# Patient Record
Sex: FEMALE | Race: ASIAN | NOT HISPANIC OR LATINO
[De-identification: names, ages, dates, MRNs, and addresses within clinical notes are randomized per-mention and may not be internally consistent; named-entity substitution may affect disease eponyms.]

---

## 2021-04-30 ENCOUNTER — APPOINTMENT (OUTPATIENT)
Dept: ORTHOPEDIC SURGERY | Facility: CLINIC | Age: 41
End: 2021-04-30
Payer: COMMERCIAL

## 2021-04-30 VITALS
HEIGHT: 60 IN | WEIGHT: 180 LBS | BODY MASS INDEX: 35.34 KG/M2 | HEART RATE: 80 BPM | SYSTOLIC BLOOD PRESSURE: 117 MMHG | DIASTOLIC BLOOD PRESSURE: 80 MMHG

## 2021-04-30 DIAGNOSIS — M94.261 CHONDROMALACIA, RIGHT KNEE: ICD-10-CM

## 2021-04-30 DIAGNOSIS — M72.2 PLANTAR FASCIAL FIBROMATOSIS: ICD-10-CM

## 2021-04-30 PROCEDURE — 73564 X-RAY EXAM KNEE 4 OR MORE: CPT | Mod: RT

## 2021-04-30 PROCEDURE — 99072 ADDL SUPL MATRL&STAF TM PHE: CPT

## 2021-04-30 PROCEDURE — 99203 OFFICE O/P NEW LOW 30 MIN: CPT

## 2021-04-30 RX ORDER — DICLOFENAC SODIUM 50 MG/1
50 TABLET, DELAYED RELEASE ORAL
Qty: 60 | Refills: 1 | Status: ACTIVE | COMMUNITY
Start: 2021-04-30 | End: 1900-01-01

## 2021-04-30 NOTE — PHYSICAL EXAM
[de-identified] : .Physical Examination\par General: well nourished, in no acute distress, alert and oriented to person, place and time\par Psychiatric: normal mood and affect, no abnormal movements or speech patterns\par Eyes: vision intact no glasses\par Throat: no thyromegaly\par Lymph: no enlarged nodes, no lymphedema in extremity\par Respiratory: no wheezing, no shortness of breath with ambulation\par Cardiac: no cardiac leg swelling, 2+ peripheral pulses\par Neurology: normal gross sensation in extremities to light touch\par Abdomen: soft, non-tender, tympanic, no masses\par \par Musculoskeletal Examination\par Ambulation	- antalgic gait, - assistive devices\par \par Knee			Right			Left\par General\par      Swelling/Deformity	normal			normal	\par      Skin			normal			normal\par      Erythema		-			-\par      Standing Alignment	neutral			neutral\par      Effusion		none			none\par Range of Motion\par      Hip			full painless ROM		full painless ROM\par      Knee Flexion		130			130\par      Knee Extension	0			0\par Patella\par      J Sign		-			-\par      Quad Medial/Lateral	1/1 1/1\par      Apprehension		-			-\par      Federico's		-			-\par      Grind Sign		-			-\par      Crepitus		-			-\par Palpation\par      Medial Joint Line	-			-\par      Medial Fem Condyle	-			-\par      Lateral Joint Line	-			-\par      Quad Tendon		-			-\par      Patella Tendon	-			-\par      Medial Patella		-			-\par      Lateral Patella 	-			-\par      Posterior Knee	-			-\par right heel medial pain\par tight achilles cord\par Ligamentous\par      Varus @ 0° / 30°	-/-			-/-\par      Valgus @ 0° / 30°	-/-			-/-\par      Lachman		-			-\par      Pivot Shift		-			-\par      Anterior Drawer	-			-\par      Posterior Drawer	-			-\par Meniscus\par      Marie		-			-\par      Flexion Pinch		-			-\par Strength Examination/Atrophy\par      Hip Flexors 		5+			5+\par      Quadriceps		5+			5+\par      Hamstring		5+			5+\par      Tibialis Anterior	5+			5+\par      Achilles/Soleus	5+			5+\par Sensation\par      Deep Peroneal	normal			normal\par      Superficial Peroneal 	normal			normal\par      Sural  		normal			normal\par      Posterior Tibial 	normal			normal\par      Saphneous 		normal			normal\par Pulses\par      DP			2+			2+\par  [de-identified] : 4 views of the affected Right knee (standing AP, flexing standing AP, 30degree flexed lateral, sunrise view)\par were ordered, obtained and evaluated by myself today and\par demonstrate:\par There is trace medial weightbearing asymmetric narrowing\par Trace osteophytic lipping\par Trace suprapatellar effusion\par Trace patellofemoral joint space loss without evidence of tilt [or] subluxation on sunrise view\par Normal soft tissue density\par Otherwise normal osseous bone structure without fracture or dislocation

## 2021-04-30 NOTE — HISTORY OF PRESENT ILLNESS
[de-identified] : CC Right knee\par \par HPI 41 yo female presents with in onset of several weeks of early pain in the posterior Right knee [without injury]. The pain is resolved, and rated a 0 out of 10, described as sharp, [without radiation]. Rest makes the pain better and walking standing stairs makes the pain worse. The patient reports associated symptoms of pain. The patient - pain at night affecting sleep, and - similar pain previously. Also reports chronic right heel pain for many years which is improved recently.\par \par The patient has tried the following treatments:\par Activity modification	+\par Ice/Compression  	+\par Braces    		-\par Nsaids    		+\par Physical Therapy 	-\par Cortisone Injection	-\par Visco Injection		-\par Zilretta			-\par Arthroscopy		-\par \par Review of Systems is positive for the above musculoskeletal symptoms and is otherwise non-contributory for general, constitutional, psychiatric, neurologic, HEENT, cardiac, respiratory, gastrointestinal, reproductive, lymphatic, and dermatologic complaints.\par \par Consult by Dr Thi Cohen\par

## 2021-04-30 NOTE — DISCUSSION/SUMMARY
[de-identified] : Right knee chondromalacia\par Right heel plantar fasciitis\par \par \par I discussed my findings and history exam and radiology\par \par Discussed operative and nonoperative modalities of treatment or plantar fasciitis. information form provided\par Given lack of nonoperative management recommend\par \par Physical therapy\par \par The patient was prescribed Diclofenac PO non-steroidal anti-inflammatory medication. 50mg tablets twice daily to be taken for at least 1-2 weeks in a row and then PRN afterwards. Risks and benefits were discussed and include but not limited to renal damage and GI ulceration and bleeding.  They were advised to take with food to limit stomach upset as well as warned to stop the medication if worsening gastric pain or dizziness or other side effects. Also to immediately stop the medication and seek appropriate medical attention if any severe stomach ache, gastritis, black/red vomit, black/red stools or any other medical concern.\par \par \par Ice heat modalities\par \par Nighttime ankle splints\par \par Followup p.r.n.\par \par \par

## 2023-05-22 ENCOUNTER — APPOINTMENT (OUTPATIENT)
Dept: MATERNAL FETAL MEDICINE | Facility: CLINIC | Age: 43
End: 2023-05-22
Payer: COMMERCIAL

## 2023-05-22 ENCOUNTER — ASOB RESULT (OUTPATIENT)
Age: 43
End: 2023-05-22

## 2023-05-22 DIAGNOSIS — O24.119 PRE-EXISTING TYPE 2 DIABETES MELLITUS, TYPE 2, IN PREGNANCY, UNSPECIFIED TRIMESTER: ICD-10-CM

## 2023-05-22 PROCEDURE — G0108 DIAB MANAGE TRN  PER INDIV: CPT | Mod: 95

## 2023-05-23 PROBLEM — O24.119 TYPE 2 DIABETES MELLITUS IN PREGNANCY: Status: ACTIVE | Noted: 2023-05-23

## 2023-05-30 ENCOUNTER — NON-APPOINTMENT (OUTPATIENT)
Age: 43
End: 2023-05-30

## 2023-05-30 ENCOUNTER — APPOINTMENT (OUTPATIENT)
Dept: ANTEPARTUM | Facility: CLINIC | Age: 43
End: 2023-05-30
Payer: COMMERCIAL

## 2023-05-30 ENCOUNTER — ASOB RESULT (OUTPATIENT)
Age: 43
End: 2023-05-30

## 2023-05-30 ENCOUNTER — LABORATORY RESULT (OUTPATIENT)
Age: 43
End: 2023-05-30

## 2023-05-30 PROCEDURE — 36416 COLLJ CAPILLARY BLOOD SPEC: CPT

## 2023-05-30 PROCEDURE — 76813 OB US NUCHAL MEAS 1 GEST: CPT

## 2023-05-30 PROCEDURE — 76801 OB US < 14 WKS SINGLE FETUS: CPT

## 2023-06-01 ENCOUNTER — NON-APPOINTMENT (OUTPATIENT)
Age: 43
End: 2023-06-01

## 2023-06-13 ENCOUNTER — ASOB RESULT (OUTPATIENT)
Age: 43
End: 2023-06-13

## 2023-06-13 ENCOUNTER — APPOINTMENT (OUTPATIENT)
Dept: MATERNAL FETAL MEDICINE | Facility: CLINIC | Age: 43
End: 2023-06-13
Payer: COMMERCIAL

## 2023-06-13 PROCEDURE — G0108 DIAB MANAGE TRN  PER INDIV: CPT | Mod: 95

## 2023-06-26 ENCOUNTER — ASOB RESULT (OUTPATIENT)
Age: 43
End: 2023-06-26

## 2023-06-26 ENCOUNTER — APPOINTMENT (OUTPATIENT)
Dept: MATERNAL FETAL MEDICINE | Facility: CLINIC | Age: 43
End: 2023-06-26
Payer: COMMERCIAL

## 2023-06-26 PROCEDURE — G0108 DIAB MANAGE TRN  PER INDIV: CPT | Mod: 95

## 2023-06-28 ENCOUNTER — NON-APPOINTMENT (OUTPATIENT)
Age: 43
End: 2023-06-28

## 2023-06-29 ENCOUNTER — APPOINTMENT (OUTPATIENT)
Dept: ULTRASOUND IMAGING | Facility: IMAGING CENTER | Age: 43
End: 2023-06-29
Payer: COMMERCIAL

## 2023-06-29 ENCOUNTER — OUTPATIENT (OUTPATIENT)
Dept: OUTPATIENT SERVICES | Facility: HOSPITAL | Age: 43
LOS: 1 days | End: 2023-06-29
Payer: COMMERCIAL

## 2023-06-29 ENCOUNTER — APPOINTMENT (OUTPATIENT)
Dept: RADIOLOGY | Facility: HOSPITAL | Age: 43
End: 2023-06-29

## 2023-06-29 ENCOUNTER — APPOINTMENT (OUTPATIENT)
Dept: NEPHROLOGY | Facility: CLINIC | Age: 43
End: 2023-06-29
Payer: COMMERCIAL

## 2023-06-29 ENCOUNTER — LABORATORY RESULT (OUTPATIENT)
Age: 43
End: 2023-06-29

## 2023-06-29 VITALS
HEART RATE: 88 BPM | TEMPERATURE: 97.1 F | HEIGHT: 60 IN | WEIGHT: 179.67 LBS | OXYGEN SATURATION: 98 % | DIASTOLIC BLOOD PRESSURE: 65 MMHG | BODY MASS INDEX: 35.28 KG/M2 | SYSTOLIC BLOOD PRESSURE: 117 MMHG

## 2023-06-29 DIAGNOSIS — Z82.49 FAMILY HISTORY OF ISCHEMIC HEART DISEASE AND OTHER DISEASES OF THE CIRCULATORY SYSTEM: ICD-10-CM

## 2023-06-29 DIAGNOSIS — R76.11 NONSPECIFIC REACTION TO TUBERCULIN SKIN TEST WITHOUT ACTIVE TUBERCULOSIS: ICD-10-CM

## 2023-06-29 DIAGNOSIS — Z83.3 FAMILY HISTORY OF DIABETES MELLITUS: ICD-10-CM

## 2023-06-29 DIAGNOSIS — R80.9 PROTEINURIA, UNSPECIFIED: ICD-10-CM

## 2023-06-29 DIAGNOSIS — Z83.438 FAMILY HISTORY OF OTHER DISORDER OF LIPOPROTEIN METABOLISM AND OTHER LIPIDEMIA: ICD-10-CM

## 2023-06-29 PROCEDURE — 76770 US EXAM ABDO BACK WALL COMP: CPT | Mod: 26

## 2023-06-29 PROCEDURE — 76770 US EXAM ABDO BACK WALL COMP: CPT

## 2023-06-29 PROCEDURE — 99204 OFFICE O/P NEW MOD 45 MIN: CPT

## 2023-06-29 PROCEDURE — 71045 X-RAY EXAM CHEST 1 VIEW: CPT | Mod: 26

## 2023-06-29 NOTE — ASSESSMENT
[FreeTextEntry1] : 42 year old female \par DM diagnosed this pregnancy (previously preDM)\par obesity\par sent for proteinuria\par \par #proteinuria- r/o GN\par May 8th 2023 24 hr urine 525mg approx 10 weeks gestation at that time\par Subsequent UA prot neg; blood +\par check ua, prot/cr\par check GN serology\par check renal and bladder sono\par \par #PEC prophylaxis\par needs baby ASA\par check baseline PEC labs\par explained signs/sx\par \par #DM-on meds\par \par \par \par \par \par \par \par \par \par \par Baby ASA

## 2023-06-29 NOTE — PHYSICAL EXAM
[General Appearance - In No Acute Distress] : in no acute distress [General Appearance - Alert] : alert [General Appearance - Well Nourished] : well nourished [General Appearance - Well Developed] : well developed [Sclera] : the sclera and conjunctiva were normal [Outer Ear] : the ears and nose were normal in appearance [Neck Appearance] : the appearance of the neck was normal [Neck Cervical Mass (___cm)] : no neck mass was observed [Jugular Venous Distention Increased] : there was no jugular-venous distention [] : no respiratory distress [Auscultation Breath Sounds / Voice Sounds] : lungs were clear to auscultation bilaterally [Respiration, Rhythm And Depth] : normal respiratory rhythm and effort [Apical Impulse] : the apical impulse was normal [Heart Rate And Rhythm] : heart rate was normal and rhythm regular [Heart Sounds] : normal S1 and S2 [Heart Sounds Gallop] : no gallops [FreeTextEntry1] : trace LE edema b/l [Bowel Sounds] : normal bowel sounds [Abdomen Soft] : soft [Abdomen Tenderness] : non-tender [No CVA Tenderness] : no ~M costovertebral angle tenderness [Abnormal Walk] : normal gait [Musculoskeletal - Swelling] : no joint swelling seen [Skin Color & Pigmentation] : normal skin color and pigmentation [Skin Turgor] : normal skin turgor [No Focal Deficits] : no focal deficits [Oriented To Time, Place, And Person] : oriented to person, place, and time [Impaired Insight] : insight and judgment were intact [Affect] : the affect was normal [Mood] : the mood was normal

## 2023-06-29 NOTE — HISTORY OF PRESENT ILLNESS
[FreeTextEntry1] : 43 yo\par #preDM Nov 2022 was on Metformin-->DM with pregnancy since March\par # +Quant Gold May 2023- evaluated by Pulm and has to get Chest Xray\par # overweight\par \par PMD Dr Jazmin Mancini - [facility]\par Ob: Dr Cohen- [facility]\par \par Pt is 17 weeks pregnant\par Sent due to proteinuria on 24 hr urine collection \par May 8th 2023 24 hr urine 525mg approx 10 weeks gestation at that time\par Subsequent UA prot neg; blood +\par \par Pregnancy x:\par This is 3rd pregnancy\par First Pregnancy Oct 2014- C section due to prolonged labor, cord around baby at 38 week. \par 2nd Pregnancy- Oct 2016- Vaginal Delivery 38 weeks; Gestational DM; low BP after delivery; \par No h/o PEC; \par 3rd pregnancy: current pregnancy: ASHLIE Dec 9th; LMP: March 4th 2023\par  This pregnancy complicated by proteinuria and DM. No hospitalizations, UTIs, nausea, vomiting\par \par Denies leg swelling\par Denies dysuria\par Denies foamy urine\par Denies gross hematuria\par No joint pains and rashes\par Other ROS neg\par \par This is the first time patient has heard of proteinuria.  \par BP normal <120/66s\par \par Meds:\par prenatal\par Iron \par insulin\par \par ALL:\par Amox: diarrhea(more of side effect)\par \par Soc hx:\par denies smoking, alcohol, drugs\par second hand smoke at work\par [employer] for workforce \par from [place]

## 2023-06-30 ENCOUNTER — NON-APPOINTMENT (OUTPATIENT)
Age: 43
End: 2023-06-30

## 2023-06-30 LAB
ALBUMIN MFR SERPL ELPH: 50.7 %
ALBUMIN SERPL ELPH-MCNC: 3.7 G/DL
ALBUMIN SERPL ELPH-MCNC: 3.7 G/DL
ALBUMIN SERPL-MCNC: 3.4 G/DL
ALBUMIN/GLOB SERPL: 1 RATIO
ALP BLD-CCNC: 69 U/L
ALPHA1 GLOB MFR SERPL ELPH: 5.1 %
ALPHA1 GLOB SERPL ELPH-MCNC: 0.3 G/DL
ALPHA2 GLOB MFR SERPL ELPH: 10.5 %
ALPHA2 GLOB SERPL ELPH-MCNC: 0.7 G/DL
ALT SERPL-CCNC: 17 U/L
ANION GAP SERPL CALC-SCNC: 14 MMOL/L
APPEARANCE: CLEAR
AST SERPL-CCNC: 20 U/L
B-GLOBULIN MFR SERPL ELPH: 14.1 %
B-GLOBULIN SERPL ELPH-MCNC: 0.9 G/DL
BACTERIA: NEGATIVE /HPF
BILIRUB DIRECT SERPL-MCNC: 0.1 MG/DL
BILIRUB INDIRECT SERPL-MCNC: 0.2 MG/DL
BILIRUB SERPL-MCNC: 0.2 MG/DL
BILIRUBIN URINE: NEGATIVE
BLOOD URINE: ABNORMAL
BUN SERPL-MCNC: 10 MG/DL
C3 SERPL-MCNC: 157 MG/DL
C4 SERPL-MCNC: 19 MG/DL
CALCIUM SERPL-MCNC: 9.2 MG/DL
CAST: 0 /LPF
CHLORIDE SERPL-SCNC: 105 MMOL/L
CO2 SERPL-SCNC: 18 MMOL/L
COLOR: YELLOW
CREAT SERPL-MCNC: 0.57 MG/DL
CREAT SPEC-SCNC: 43 MG/DL
CREAT/PROT UR: 0.2 RATIO
DEPRECATED KAPPA LC FREE/LAMBDA SER: 1.08 RATIO
EGFR: 116 ML/MIN/1.73M2
EPITHELIAL CELLS: 1 /HPF
ESTIMATED AVERAGE GLUCOSE: 131 MG/DL
FERRITIN SERPL-MCNC: 248 NG/ML
GAMMA GLOB FLD ELPH-MCNC: 1.3 G/DL
GAMMA GLOB MFR SERPL ELPH: 19.6 %
GLUCOSE QUALITATIVE U: NEGATIVE MG/DL
GLUCOSE SERPL-MCNC: 87 MG/DL
HAPTOGLOB SERPL-MCNC: 152 MG/DL
HBA1C MFR BLD HPLC: 6.2 %
HBV CORE IGG+IGM SER QL: NONREACTIVE
HBV CORE IGM SER QL: NONREACTIVE
HBV SURFACE AB SER QL: NONREACTIVE
HBV SURFACE AB SERPL IA-ACNC: <3 MIU/ML
HCV AB SER QL: NONREACTIVE
HCV S/CO RATIO: 0.08 S/CO
HIV1+2 AB SPEC QL IA.RAPID: NONREACTIVE
INTERPRETATION SERPL IEP-IMP: NORMAL
IRON SATN MFR SERPL: 16 %
IRON SERPL-MCNC: 53 UG/DL
KAPPA LC CSF-MCNC: 2.23 MG/DL
KAPPA LC SERPL-MCNC: 2.4 MG/DL
KETONES URINE: NEGATIVE MG/DL
LDH SERPL-CCNC: 166 U/L
LEUKOCYTE ESTERASE URINE: NEGATIVE
M PROTEIN SPEC IFE-MCNC: NORMAL
MAGNESIUM SERPL-MCNC: 1.8 MG/DL
MICROSCOPIC-UA: NORMAL
NITRITE URINE: NEGATIVE
PH URINE: 6.5
PHOSPHATE SERPL-MCNC: 3.8 MG/DL
POTASSIUM SERPL-SCNC: 4.1 MMOL/L
PROT SERPL-MCNC: 6.7 G/DL
PROT UR-MCNC: 10 MG/DL
PROTEIN URINE: NORMAL MG/DL
RED BLOOD CELLS URINE: 0 /HPF
REVIEW: NORMAL
RPR SER-TITR: NORMAL
SODIUM SERPL-SCNC: 137 MMOL/L
SPECIFIC GRAVITY URINE: 1.01
TIBC SERPL-MCNC: 338 UG/DL
UIBC SERPL-MCNC: 285 UG/DL
URATE SERPL-MCNC: 4.4 MG/DL
UROBILINOGEN URINE: 0.2 MG/DL
WHITE BLOOD CELLS URINE: 0 /HPF

## 2023-07-10 ENCOUNTER — APPOINTMENT (OUTPATIENT)
Dept: MATERNAL FETAL MEDICINE | Facility: CLINIC | Age: 43
End: 2023-07-10
Payer: COMMERCIAL

## 2023-07-10 ENCOUNTER — ASOB RESULT (OUTPATIENT)
Age: 43
End: 2023-07-10

## 2023-07-10 PROCEDURE — G0108 DIAB MANAGE TRN  PER INDIV: CPT | Mod: 95

## 2023-07-24 ENCOUNTER — ASOB RESULT (OUTPATIENT)
Age: 43
End: 2023-07-24

## 2023-07-24 ENCOUNTER — APPOINTMENT (OUTPATIENT)
Dept: ANTEPARTUM | Facility: CLINIC | Age: 43
End: 2023-07-24
Payer: COMMERCIAL

## 2023-07-24 ENCOUNTER — APPOINTMENT (OUTPATIENT)
Dept: MATERNAL FETAL MEDICINE | Facility: CLINIC | Age: 43
End: 2023-07-24

## 2023-07-24 PROCEDURE — 76811 OB US DETAILED SNGL FETUS: CPT

## 2023-07-27 ENCOUNTER — NON-APPOINTMENT (OUTPATIENT)
Age: 43
End: 2023-07-27

## 2023-08-01 ENCOUNTER — APPOINTMENT (OUTPATIENT)
Dept: ANTEPARTUM | Facility: CLINIC | Age: 43
End: 2023-08-01
Payer: COMMERCIAL

## 2023-08-01 ENCOUNTER — ASOB RESULT (OUTPATIENT)
Age: 43
End: 2023-08-01

## 2023-08-01 PROCEDURE — 76816 OB US FOLLOW-UP PER FETUS: CPT

## 2023-08-01 PROCEDURE — 93325 DOPPLER ECHO COLOR FLOW MAPG: CPT

## 2023-08-01 PROCEDURE — 76825 ECHO EXAM OF FETAL HEART: CPT

## 2023-08-01 PROCEDURE — 76827 ECHO EXAM OF FETAL HEART: CPT | Mod: 59

## 2023-08-02 LAB
ALBUMIN SERPL ELPH-MCNC: 3.6 G/DL
ALBUMIN SERPL ELPH-MCNC: 3.6 G/DL
ALP BLD-CCNC: 75 U/L
ALT SERPL-CCNC: 14 U/L
ANA SER IF-ACNC: NEGATIVE
ANION GAP SERPL CALC-SCNC: 12 MMOL/L
APPEARANCE: CLEAR
AST SERPL-CCNC: 18 U/L
BACTERIA: NEGATIVE /HPF
BILIRUB DIRECT SERPL-MCNC: 0.1 MG/DL
BILIRUB INDIRECT SERPL-MCNC: 0.1 MG/DL
BILIRUB SERPL-MCNC: 0.2 MG/DL
BILIRUBIN URINE: NEGATIVE
BLOOD URINE: ABNORMAL
BSA DERIVED: 1.73 M2
BUN SERPL-MCNC: 8 MG/DL
CALCIUM SERPL-MCNC: 8.8 MG/DL
CAST: 0 /LPF
CHLORIDE SERPL-SCNC: 105 MMOL/L
CO2 SERPL-SCNC: 19 MMOL/L
COLOR: YELLOW
CREAT 24H UR-MCNC: 1.2 G/24 H
CREAT ?TM UR-MCNC: 43 MG/DL
CREAT CL 24H UR+SERPL-VRATE: 167 ML/MIN
CREAT SERPL-MCNC: 0.52 MG/DL
CREAT SPEC-SCNC: 27 MG/DL
CREAT/PROT UR: 0.3 RATIO
DSDNA AB SER-ACNC: <12 IU/ML
EGFR: 118 ML/MIN/1.73M2
EPITHELIAL CELLS: 2 /HPF
FERRITIN SERPL-MCNC: 196 NG/ML
GBM AB TITR SER IF: <0.2
GLUCOSE QUALITATIVE U: NEGATIVE MG/DL
GLUCOSE SERPL-MCNC: 93 MG/DL
HAPTOGLOB SERPL-MCNC: 134 MG/DL
IRON SATN MFR SERPL: 16 %
IRON SERPL-MCNC: 55 UG/DL
KETONES URINE: NEGATIVE MG/DL
LDH SERPL-CCNC: 164 U/L
LEUKOCYTE ESTERASE URINE: NEGATIVE
MAGNESIUM SERPL-MCNC: 1.7 MG/DL
MICROSCOPIC-UA: NORMAL
NITRITE URINE: NEGATIVE
PH URINE: 7
PHOSPHATE SERPL-MCNC: 3.6 MG/DL
PHOSPHOLIPASE A2 RECEPTOR ELISA: <1.8 RU/ML
POTASSIUM SERPL-SCNC: 3.7 MMOL/L
PROT 24H UR-MRATE: 8 MG/DL
PROT ?TM UR-MCNC: 24 HR
PROT SERPL-MCNC: 6.5 G/DL
PROT UR-MCNC: 232 MG/24 H
PROT UR-MCNC: 7 MG/DL
PROTEIN URINE: NEGATIVE MG/DL
RED BLOOD CELLS URINE: 0 /HPF
SODIUM SERPL-SCNC: 137 MMOL/L
SPECIFIC GRAVITY URINE: 1.01
SPECIMEN VOL 24H UR: 2900 ML
TIBC SERPL-MCNC: 344 UG/DL
UIBC SERPL-MCNC: 288 UG/DL
URATE SERPL-MCNC: 3.6 MG/DL
UROBILINOGEN URINE: 0.2 MG/DL
WHITE BLOOD CELLS URINE: 1 /HPF

## 2023-08-03 ENCOUNTER — ASOB RESULT (OUTPATIENT)
Age: 43
End: 2023-08-03

## 2023-08-03 ENCOUNTER — TRANSCRIPTION ENCOUNTER (OUTPATIENT)
Age: 43
End: 2023-08-03

## 2023-08-03 ENCOUNTER — APPOINTMENT (OUTPATIENT)
Dept: MATERNAL FETAL MEDICINE | Facility: CLINIC | Age: 43
End: 2023-08-03
Payer: COMMERCIAL

## 2023-08-03 PROCEDURE — G0108 DIAB MANAGE TRN  PER INDIV: CPT | Mod: 95

## 2023-08-14 ENCOUNTER — ASOB RESULT (OUTPATIENT)
Age: 43
End: 2023-08-14

## 2023-08-14 ENCOUNTER — APPOINTMENT (OUTPATIENT)
Dept: MATERNAL FETAL MEDICINE | Facility: CLINIC | Age: 43
End: 2023-08-14
Payer: COMMERCIAL

## 2023-08-14 PROCEDURE — G0108 DIAB MANAGE TRN  PER INDIV: CPT | Mod: 95

## 2023-08-22 ENCOUNTER — APPOINTMENT (OUTPATIENT)
Dept: MATERNAL FETAL MEDICINE | Facility: CLINIC | Age: 43
End: 2023-08-22
Payer: COMMERCIAL

## 2023-08-22 ENCOUNTER — ASOB RESULT (OUTPATIENT)
Age: 43
End: 2023-08-22

## 2023-08-22 PROCEDURE — G0108 DIAB MANAGE TRN  PER INDIV: CPT | Mod: 95

## 2023-08-28 ENCOUNTER — ASOB RESULT (OUTPATIENT)
Age: 43
End: 2023-08-28

## 2023-08-28 ENCOUNTER — NON-APPOINTMENT (OUTPATIENT)
Age: 43
End: 2023-08-28

## 2023-08-28 ENCOUNTER — APPOINTMENT (OUTPATIENT)
Dept: ANTEPARTUM | Facility: CLINIC | Age: 43
End: 2023-08-28
Payer: COMMERCIAL

## 2023-08-28 PROCEDURE — 76816 OB US FOLLOW-UP PER FETUS: CPT

## 2023-09-11 ENCOUNTER — APPOINTMENT (OUTPATIENT)
Dept: MATERNAL FETAL MEDICINE | Facility: CLINIC | Age: 43
End: 2023-09-11
Payer: COMMERCIAL

## 2023-09-11 ENCOUNTER — ASOB RESULT (OUTPATIENT)
Age: 43
End: 2023-09-11

## 2023-09-11 PROCEDURE — G0108 DIAB MANAGE TRN  PER INDIV: CPT | Mod: 95

## 2023-09-12 ENCOUNTER — APPOINTMENT (OUTPATIENT)
Dept: NEPHROLOGY | Facility: CLINIC | Age: 43
End: 2023-09-12

## 2023-09-25 ENCOUNTER — ASOB RESULT (OUTPATIENT)
Age: 43
End: 2023-09-25

## 2023-09-25 ENCOUNTER — APPOINTMENT (OUTPATIENT)
Dept: MATERNAL FETAL MEDICINE | Facility: CLINIC | Age: 43
End: 2023-09-25
Payer: COMMERCIAL

## 2023-09-25 PROCEDURE — G0108 DIAB MANAGE TRN  PER INDIV: CPT | Mod: 95

## 2023-09-28 ENCOUNTER — APPOINTMENT (OUTPATIENT)
Dept: ANTEPARTUM | Facility: CLINIC | Age: 43
End: 2023-09-28
Payer: COMMERCIAL

## 2023-09-28 ENCOUNTER — ASOB RESULT (OUTPATIENT)
Age: 43
End: 2023-09-28

## 2023-09-28 PROCEDURE — 76816 OB US FOLLOW-UP PER FETUS: CPT

## 2023-09-28 PROCEDURE — 76819 FETAL BIOPHYS PROFIL W/O NST: CPT | Mod: 59

## 2023-10-02 ENCOUNTER — APPOINTMENT (OUTPATIENT)
Dept: MATERNAL FETAL MEDICINE | Facility: CLINIC | Age: 43
End: 2023-10-02
Payer: COMMERCIAL

## 2023-10-02 ENCOUNTER — ASOB RESULT (OUTPATIENT)
Age: 43
End: 2023-10-02

## 2023-10-02 PROCEDURE — G0108 DIAB MANAGE TRN  PER INDIV: CPT | Mod: 95

## 2023-10-09 ENCOUNTER — APPOINTMENT (OUTPATIENT)
Dept: ANTEPARTUM | Facility: CLINIC | Age: 43
End: 2023-10-09
Payer: COMMERCIAL

## 2023-10-09 ENCOUNTER — ASOB RESULT (OUTPATIENT)
Age: 43
End: 2023-10-09

## 2023-10-09 ENCOUNTER — NON-APPOINTMENT (OUTPATIENT)
Age: 43
End: 2023-10-09

## 2023-10-09 ENCOUNTER — APPOINTMENT (OUTPATIENT)
Dept: MATERNAL FETAL MEDICINE | Facility: CLINIC | Age: 43
End: 2023-10-09

## 2023-10-09 PROCEDURE — 76818 FETAL BIOPHYS PROFILE W/NST: CPT

## 2023-10-16 ENCOUNTER — ASOB RESULT (OUTPATIENT)
Age: 43
End: 2023-10-16

## 2023-10-16 ENCOUNTER — APPOINTMENT (OUTPATIENT)
Dept: ANTEPARTUM | Facility: CLINIC | Age: 43
End: 2023-10-16

## 2023-10-16 ENCOUNTER — APPOINTMENT (OUTPATIENT)
Dept: MATERNAL FETAL MEDICINE | Facility: CLINIC | Age: 43
End: 2023-10-16
Payer: COMMERCIAL

## 2023-10-16 PROCEDURE — G0108 DIAB MANAGE TRN  PER INDIV: CPT | Mod: 95

## 2023-10-17 ENCOUNTER — ASOB RESULT (OUTPATIENT)
Age: 43
End: 2023-10-17

## 2023-10-17 ENCOUNTER — APPOINTMENT (OUTPATIENT)
Dept: ANTEPARTUM | Facility: CLINIC | Age: 43
End: 2023-10-17
Payer: COMMERCIAL

## 2023-10-17 PROCEDURE — 76818 FETAL BIOPHYS PROFILE W/NST: CPT

## 2023-10-23 ENCOUNTER — APPOINTMENT (OUTPATIENT)
Dept: MATERNAL FETAL MEDICINE | Facility: CLINIC | Age: 43
End: 2023-10-23
Payer: COMMERCIAL

## 2023-10-23 ENCOUNTER — ASOB RESULT (OUTPATIENT)
Age: 43
End: 2023-10-23

## 2023-10-23 PROCEDURE — 99204 OFFICE O/P NEW MOD 45 MIN: CPT | Mod: 95

## 2023-10-24 ENCOUNTER — APPOINTMENT (OUTPATIENT)
Dept: ANTEPARTUM | Facility: CLINIC | Age: 43
End: 2023-10-24

## 2023-10-24 ENCOUNTER — ASOB RESULT (OUTPATIENT)
Age: 43
End: 2023-10-24

## 2023-10-24 ENCOUNTER — APPOINTMENT (OUTPATIENT)
Dept: ANTEPARTUM | Facility: CLINIC | Age: 43
End: 2023-10-24
Payer: COMMERCIAL

## 2023-10-24 PROCEDURE — 76816 OB US FOLLOW-UP PER FETUS: CPT

## 2023-10-24 PROCEDURE — 76818 FETAL BIOPHYS PROFILE W/NST: CPT | Mod: 59

## 2023-10-24 RX ORDER — INSULIN ASPART 100 [IU]/ML
100 INJECTION, SOLUTION INTRAVENOUS; SUBCUTANEOUS
Qty: 2 | Refills: 2 | Status: DISCONTINUED | COMMUNITY
Start: 2023-07-10 | End: 2023-10-24

## 2023-10-25 ENCOUNTER — APPOINTMENT (OUTPATIENT)
Dept: NEPHROLOGY | Facility: CLINIC | Age: 43
End: 2023-10-25
Payer: COMMERCIAL

## 2023-10-25 VITALS
BODY MASS INDEX: 38.95 KG/M2 | SYSTOLIC BLOOD PRESSURE: 104 MMHG | TEMPERATURE: 98 F | OXYGEN SATURATION: 99 % | HEART RATE: 94 BPM | WEIGHT: 198.41 LBS | DIASTOLIC BLOOD PRESSURE: 69 MMHG | HEIGHT: 60 IN

## 2023-10-25 DIAGNOSIS — R80.9 PROTEINURIA, UNSPECIFIED: ICD-10-CM

## 2023-10-25 PROCEDURE — 99214 OFFICE O/P EST MOD 30 MIN: CPT

## 2023-10-26 ENCOUNTER — NON-APPOINTMENT (OUTPATIENT)
Age: 43
End: 2023-10-26

## 2023-10-27 LAB
ALBUMIN SERPL ELPH-MCNC: 3.3 G/DL
ALBUMIN SERPL ELPH-MCNC: 3.3 G/DL
ALP BLD-CCNC: 87 U/L
ALT SERPL-CCNC: 23 U/L
ANION GAP SERPL CALC-SCNC: 12 MMOL/L
APPEARANCE: CLEAR
AST SERPL-CCNC: 26 U/L
BACTERIA: ABNORMAL /HPF
BILIRUB DIRECT SERPL-MCNC: 0.1 MG/DL
BILIRUB INDIRECT SERPL-MCNC: 0.1 MG/DL
BILIRUB SERPL-MCNC: 0.2 MG/DL
BILIRUBIN URINE: NEGATIVE
BLOOD URINE: NEGATIVE
BUN SERPL-MCNC: 15 MG/DL
CALCIUM SERPL-MCNC: 8.9 MG/DL
CAST: 1 /LPF
CHLORIDE SERPL-SCNC: 103 MMOL/L
CO2 SERPL-SCNC: 20 MMOL/L
COLOR: YELLOW
CREAT SERPL-MCNC: 0.63 MG/DL
CREAT SPEC-SCNC: 59 MG/DL
CREAT/PROT UR: 0.2 RATIO
EGFR: 112 ML/MIN/1.73M2
EPITHELIAL CELLS: 5 /HPF
ESTIMATED AVERAGE GLUCOSE: 117 MG/DL
FERRITIN SERPL-MCNC: 141 NG/ML
GLUCOSE QUALITATIVE U: NEGATIVE MG/DL
GLUCOSE SERPL-MCNC: 124 MG/DL
HAPTOGLOB SERPL-MCNC: 138 MG/DL
HBA1C MFR BLD HPLC: 5.7 %
IRON SATN MFR SERPL: 15 %
IRON SERPL-MCNC: 59 UG/DL
KETONES URINE: NEGATIVE MG/DL
LDH SERPL-CCNC: 195 U/L
LEUKOCYTE ESTERASE URINE: ABNORMAL
MAGNESIUM SERPL-MCNC: 1.7 MG/DL
MICROSCOPIC-UA: NORMAL
NITRITE URINE: NEGATIVE
PH URINE: 6.5
PHOSPHATE SERPL-MCNC: 4.1 MG/DL
POTASSIUM SERPL-SCNC: 4 MMOL/L
PROT SERPL-MCNC: 6.4 G/DL
PROT UR-MCNC: 11 MG/DL
PROTEIN URINE: NEGATIVE MG/DL
RED BLOOD CELLS URINE: 0 /HPF
SODIUM SERPL-SCNC: 135 MMOL/L
SPECIFIC GRAVITY URINE: 1.02
TIBC SERPL-MCNC: 400 UG/DL
UIBC SERPL-MCNC: 341 UG/DL
URATE SERPL-MCNC: 4.1 MG/DL
UROBILINOGEN URINE: 0.2 MG/DL
WHITE BLOOD CELLS URINE: 3 /HPF

## 2023-10-30 ENCOUNTER — APPOINTMENT (OUTPATIENT)
Dept: ANTEPARTUM | Facility: CLINIC | Age: 43
End: 2023-10-30
Payer: COMMERCIAL

## 2023-10-30 ENCOUNTER — ASOB RESULT (OUTPATIENT)
Age: 43
End: 2023-10-30

## 2023-10-30 ENCOUNTER — APPOINTMENT (OUTPATIENT)
Dept: MATERNAL FETAL MEDICINE | Facility: CLINIC | Age: 43
End: 2023-10-30
Payer: COMMERCIAL

## 2023-10-30 PROCEDURE — 76818 FETAL BIOPHYS PROFILE W/NST: CPT

## 2023-10-30 PROCEDURE — G0108 DIAB MANAGE TRN  PER INDIV: CPT

## 2023-11-06 ENCOUNTER — APPOINTMENT (OUTPATIENT)
Dept: ANTEPARTUM | Facility: CLINIC | Age: 43
End: 2023-11-06
Payer: COMMERCIAL

## 2023-11-06 ENCOUNTER — ASOB RESULT (OUTPATIENT)
Age: 43
End: 2023-11-06

## 2023-11-06 ENCOUNTER — APPOINTMENT (OUTPATIENT)
Dept: MATERNAL FETAL MEDICINE | Facility: CLINIC | Age: 43
End: 2023-11-06
Payer: COMMERCIAL

## 2023-11-06 PROCEDURE — 76818 FETAL BIOPHYS PROFILE W/NST: CPT

## 2023-11-06 PROCEDURE — G0108 DIAB MANAGE TRN  PER INDIV: CPT | Mod: 95

## 2023-11-13 ENCOUNTER — ASOB RESULT (OUTPATIENT)
Age: 43
End: 2023-11-13

## 2023-11-13 ENCOUNTER — APPOINTMENT (OUTPATIENT)
Dept: ANTEPARTUM | Facility: CLINIC | Age: 43
End: 2023-11-13
Payer: COMMERCIAL

## 2023-11-13 ENCOUNTER — APPOINTMENT (OUTPATIENT)
Dept: MATERNAL FETAL MEDICINE | Facility: CLINIC | Age: 43
End: 2023-11-13
Payer: COMMERCIAL

## 2023-11-13 PROCEDURE — 76818 FETAL BIOPHYS PROFILE W/NST: CPT

## 2023-11-13 PROCEDURE — G0108 DIAB MANAGE TRN  PER INDIV: CPT | Mod: 95

## 2023-11-15 RX ORDER — INSULIN DETEMIR 100 [IU]/ML
100 INJECTION, SOLUTION SUBCUTANEOUS
Qty: 2 | Refills: 0 | Status: ACTIVE | COMMUNITY
Start: 2023-05-23 | End: 1900-01-01

## 2023-11-15 RX ORDER — BLOOD-GLUCOSE METER
KIT MISCELLANEOUS
Qty: 2 | Refills: 2 | Status: ACTIVE | COMMUNITY
Start: 2023-08-03 | End: 1900-01-01

## 2023-11-15 RX ORDER — LANCETS 33 GAUGE
EACH MISCELLANEOUS
Qty: 4 | Refills: 2 | Status: ACTIVE | COMMUNITY
Start: 2023-11-15 | End: 1900-01-01

## 2023-11-15 RX ORDER — PEN NEEDLE, DIABETIC 29 G X1/2"
32G X 4 MM NEEDLE, DISPOSABLE MISCELLANEOUS
Qty: 400 | Refills: 3 | Status: ACTIVE | COMMUNITY
Start: 2023-07-10 | End: 1900-01-01

## 2023-11-15 RX ORDER — SYRINGE-NEEDLE,INSULIN,0.5 ML 31GX15/64"
31G X 15/64" SYRINGE, EMPTY DISPOSABLE MISCELLANEOUS
Qty: 2 | Refills: 0 | Status: ACTIVE | COMMUNITY
Start: 2023-10-24 | End: 1900-01-01

## 2023-11-15 RX ORDER — FLASH GLUCOSE SENSOR
KIT MISCELLANEOUS
Qty: 1 | Refills: 3 | Status: ACTIVE | COMMUNITY
Start: 2023-10-13 | End: 1900-01-01

## 2023-11-16 ENCOUNTER — APPOINTMENT (OUTPATIENT)
Dept: ANTEPARTUM | Facility: CLINIC | Age: 43
End: 2023-11-16

## 2023-11-16 ENCOUNTER — ASOB RESULT (OUTPATIENT)
Age: 43
End: 2023-11-16

## 2023-11-16 RX ORDER — INSULIN ASPART 100 [IU]/ML
100 INJECTION, SOLUTION INTRAVENOUS; SUBCUTANEOUS
Qty: 4 | Refills: 2 | Status: DISCONTINUED | COMMUNITY
Start: 2023-10-24 | End: 2023-11-16

## 2023-11-16 RX ORDER — PEN NEEDLE, DIABETIC 29 G X1/2"
32G X 4 MM NEEDLE, DISPOSABLE MISCELLANEOUS
Qty: 2 | Refills: 0 | Status: ACTIVE | COMMUNITY
Start: 2023-11-16 | End: 1900-01-01

## 2023-11-16 RX ORDER — INSULIN LISPRO 100 [IU]/ML
100 INJECTION, SOLUTION INTRAVENOUS; SUBCUTANEOUS
Qty: 3 | Refills: 2 | Status: ACTIVE | COMMUNITY
Start: 2023-11-16 | End: 1900-01-01

## 2023-11-16 RX ORDER — PEN NEEDLE, DIABETIC 29 G X1/2"
32G X 4 MM NEEDLE, DISPOSABLE MISCELLANEOUS
Qty: 1 | Refills: 2 | Status: DISCONTINUED | COMMUNITY
Start: 2023-05-23 | End: 2023-11-16

## 2023-11-20 ENCOUNTER — OUTPATIENT (OUTPATIENT)
Dept: OUTPATIENT SERVICES | Facility: HOSPITAL | Age: 43
LOS: 1 days | End: 2023-11-20

## 2023-11-20 ENCOUNTER — ASOB RESULT (OUTPATIENT)
Age: 43
End: 2023-11-20

## 2023-11-20 ENCOUNTER — APPOINTMENT (OUTPATIENT)
Dept: ANTEPARTUM | Facility: CLINIC | Age: 43
End: 2023-11-20
Payer: COMMERCIAL

## 2023-11-20 ENCOUNTER — APPOINTMENT (OUTPATIENT)
Dept: MATERNAL FETAL MEDICINE | Facility: CLINIC | Age: 43
End: 2023-11-20
Payer: COMMERCIAL

## 2023-11-20 ENCOUNTER — APPOINTMENT (OUTPATIENT)
Dept: NEPHROLOGY | Facility: CLINIC | Age: 43
End: 2023-11-20

## 2023-11-20 VITALS
SYSTOLIC BLOOD PRESSURE: 108 MMHG | OXYGEN SATURATION: 100 % | HEART RATE: 79 BPM | WEIGHT: 199.96 LBS | RESPIRATION RATE: 16 BRPM | TEMPERATURE: 98 F | DIASTOLIC BLOOD PRESSURE: 71 MMHG | HEIGHT: 59.25 IN

## 2023-11-20 DIAGNOSIS — O24.414 GESTATIONAL DIABETES MELLITUS IN PREGNANCY, INSULIN CONTROLLED: ICD-10-CM

## 2023-11-20 DIAGNOSIS — Z98.891 HISTORY OF UTERINE SCAR FROM PREVIOUS SURGERY: Chronic | ICD-10-CM

## 2023-11-20 DIAGNOSIS — O24.419 GESTATIONAL DIABETES MELLITUS IN PREGNANCY, UNSPECIFIED CONTROL: ICD-10-CM

## 2023-11-20 DIAGNOSIS — O34.211 MATERNAL CARE FOR LOW TRANSVERSE SCAR FROM PREVIOUS CESAREAN DELIVERY: ICD-10-CM

## 2023-11-20 LAB
ANION GAP SERPL CALC-SCNC: 11 MMOL/L — SIGNIFICANT CHANGE UP (ref 7–14)
ANION GAP SERPL CALC-SCNC: 11 MMOL/L — SIGNIFICANT CHANGE UP (ref 7–14)
APPEARANCE UR: CLEAR — SIGNIFICANT CHANGE UP
APPEARANCE UR: CLEAR — SIGNIFICANT CHANGE UP
BILIRUB UR-MCNC: NEGATIVE — SIGNIFICANT CHANGE UP
BILIRUB UR-MCNC: NEGATIVE — SIGNIFICANT CHANGE UP
BUN SERPL-MCNC: 11 MG/DL — SIGNIFICANT CHANGE UP (ref 7–23)
BUN SERPL-MCNC: 11 MG/DL — SIGNIFICANT CHANGE UP (ref 7–23)
CALCIUM SERPL-MCNC: 9.3 MG/DL — SIGNIFICANT CHANGE UP (ref 8.4–10.5)
CALCIUM SERPL-MCNC: 9.3 MG/DL — SIGNIFICANT CHANGE UP (ref 8.4–10.5)
CHLORIDE SERPL-SCNC: 103 MMOL/L — SIGNIFICANT CHANGE UP (ref 98–107)
CHLORIDE SERPL-SCNC: 103 MMOL/L — SIGNIFICANT CHANGE UP (ref 98–107)
CO2 SERPL-SCNC: 23 MMOL/L — SIGNIFICANT CHANGE UP (ref 22–31)
CO2 SERPL-SCNC: 23 MMOL/L — SIGNIFICANT CHANGE UP (ref 22–31)
COLOR SPEC: YELLOW — SIGNIFICANT CHANGE UP
COLOR SPEC: YELLOW — SIGNIFICANT CHANGE UP
CREAT SERPL-MCNC: 0.64 MG/DL — SIGNIFICANT CHANGE UP (ref 0.5–1.3)
CREAT SERPL-MCNC: 0.64 MG/DL — SIGNIFICANT CHANGE UP (ref 0.5–1.3)
DIFF PNL FLD: NEGATIVE — SIGNIFICANT CHANGE UP
DIFF PNL FLD: NEGATIVE — SIGNIFICANT CHANGE UP
EGFR: 112 ML/MIN/1.73M2 — SIGNIFICANT CHANGE UP
EGFR: 112 ML/MIN/1.73M2 — SIGNIFICANT CHANGE UP
GLUCOSE SERPL-MCNC: 65 MG/DL — LOW (ref 70–99)
GLUCOSE SERPL-MCNC: 65 MG/DL — LOW (ref 70–99)
GLUCOSE UR QL: NEGATIVE MG/DL — SIGNIFICANT CHANGE UP
GLUCOSE UR QL: NEGATIVE MG/DL — SIGNIFICANT CHANGE UP
HCT VFR BLD CALC: 35.4 % — SIGNIFICANT CHANGE UP (ref 34.5–45)
HCT VFR BLD CALC: 35.4 % — SIGNIFICANT CHANGE UP (ref 34.5–45)
HGB BLD-MCNC: 10.9 G/DL — LOW (ref 11.5–15.5)
HGB BLD-MCNC: 10.9 G/DL — LOW (ref 11.5–15.5)
KETONES UR-MCNC: NEGATIVE MG/DL — SIGNIFICANT CHANGE UP
KETONES UR-MCNC: NEGATIVE MG/DL — SIGNIFICANT CHANGE UP
LEUKOCYTE ESTERASE UR-ACNC: NEGATIVE — SIGNIFICANT CHANGE UP
LEUKOCYTE ESTERASE UR-ACNC: NEGATIVE — SIGNIFICANT CHANGE UP
MCHC RBC-ENTMCNC: 27.9 PG — SIGNIFICANT CHANGE UP (ref 27–34)
MCHC RBC-ENTMCNC: 27.9 PG — SIGNIFICANT CHANGE UP (ref 27–34)
MCHC RBC-ENTMCNC: 30.8 GM/DL — LOW (ref 32–36)
MCHC RBC-ENTMCNC: 30.8 GM/DL — LOW (ref 32–36)
MCV RBC AUTO: 90.8 FL — SIGNIFICANT CHANGE UP (ref 80–100)
MCV RBC AUTO: 90.8 FL — SIGNIFICANT CHANGE UP (ref 80–100)
NITRITE UR-MCNC: NEGATIVE — SIGNIFICANT CHANGE UP
NITRITE UR-MCNC: NEGATIVE — SIGNIFICANT CHANGE UP
NRBC # BLD: 0 /100 WBCS — SIGNIFICANT CHANGE UP (ref 0–0)
NRBC # BLD: 0 /100 WBCS — SIGNIFICANT CHANGE UP (ref 0–0)
NRBC # FLD: 0 K/UL — SIGNIFICANT CHANGE UP (ref 0–0)
NRBC # FLD: 0 K/UL — SIGNIFICANT CHANGE UP (ref 0–0)
PH UR: 6.5 — SIGNIFICANT CHANGE UP (ref 5–8)
PH UR: 6.5 — SIGNIFICANT CHANGE UP (ref 5–8)
PLATELET # BLD AUTO: 270 K/UL — SIGNIFICANT CHANGE UP (ref 150–400)
PLATELET # BLD AUTO: 270 K/UL — SIGNIFICANT CHANGE UP (ref 150–400)
POTASSIUM SERPL-MCNC: 3.8 MMOL/L — SIGNIFICANT CHANGE UP (ref 3.5–5.3)
POTASSIUM SERPL-MCNC: 3.8 MMOL/L — SIGNIFICANT CHANGE UP (ref 3.5–5.3)
POTASSIUM SERPL-SCNC: 3.8 MMOL/L — SIGNIFICANT CHANGE UP (ref 3.5–5.3)
POTASSIUM SERPL-SCNC: 3.8 MMOL/L — SIGNIFICANT CHANGE UP (ref 3.5–5.3)
PROT UR-MCNC: NEGATIVE MG/DL — SIGNIFICANT CHANGE UP
PROT UR-MCNC: NEGATIVE MG/DL — SIGNIFICANT CHANGE UP
RBC # BLD: 3.9 M/UL — SIGNIFICANT CHANGE UP (ref 3.8–5.2)
RBC # BLD: 3.9 M/UL — SIGNIFICANT CHANGE UP (ref 3.8–5.2)
RBC # FLD: 15.2 % — HIGH (ref 10.3–14.5)
RBC # FLD: 15.2 % — HIGH (ref 10.3–14.5)
SODIUM SERPL-SCNC: 137 MMOL/L — SIGNIFICANT CHANGE UP (ref 135–145)
SODIUM SERPL-SCNC: 137 MMOL/L — SIGNIFICANT CHANGE UP (ref 135–145)
SP GR SPEC: 1.01 — SIGNIFICANT CHANGE UP (ref 1–1.03)
SP GR SPEC: 1.01 — SIGNIFICANT CHANGE UP (ref 1–1.03)
UROBILINOGEN FLD QL: 0.2 MG/DL — SIGNIFICANT CHANGE UP (ref 0.2–1)
UROBILINOGEN FLD QL: 0.2 MG/DL — SIGNIFICANT CHANGE UP (ref 0.2–1)
WBC # BLD: 9.41 K/UL — SIGNIFICANT CHANGE UP (ref 3.8–10.5)
WBC # BLD: 9.41 K/UL — SIGNIFICANT CHANGE UP (ref 3.8–10.5)
WBC # FLD AUTO: 9.41 K/UL — SIGNIFICANT CHANGE UP (ref 3.8–10.5)
WBC # FLD AUTO: 9.41 K/UL — SIGNIFICANT CHANGE UP (ref 3.8–10.5)

## 2023-11-20 PROCEDURE — 76816 OB US FOLLOW-UP PER FETUS: CPT

## 2023-11-20 PROCEDURE — 76818 FETAL BIOPHYS PROFILE W/NST: CPT

## 2023-11-20 PROCEDURE — G0108 DIAB MANAGE TRN  PER INDIV: CPT | Mod: 95

## 2023-11-20 RX ORDER — SODIUM CHLORIDE 9 MG/ML
1000 INJECTION, SOLUTION INTRAVENOUS
Refills: 0 | Status: DISCONTINUED | OUTPATIENT
Start: 2023-11-25 | End: 2023-11-26

## 2023-11-20 NOTE — OB PST NOTE - FALL HARM RISK - UNIVERSAL INTERVENTIONS
Bed in lowest position, wheels locked, appropriate side rails in place/Call bell, personal items and telephone in reach/Instruct patient to call for assistance before getting out of bed or chair/Non-slip footwear when patient is out of bed/[place] to call system/Physically safe environment - no spills, clutter or unnecessary equipment/Purposeful Proactive Rounding/Room/bathroom lighting operational, light cord in reach

## 2023-11-20 NOTE — OB PST NOTE - HISTORY OF PRESENT ILLNESS
43 year old pregnant patient presents at  37 weeks gestation   ASHLIE: 23  Reports normal fetal movement.   G 3 P 2  Denies pelvic pain, sustained back pain, vaginal bleeding, or leakage of amniotic fluid.    Patient presents for PST evaluation for planned  section with history of , gestational diabetes

## 2023-11-20 NOTE — OB PST NOTE - NSICDXFAMILYHX_GEN_ALL_CORE_FT
FAMILY HISTORY:  Father  Still living? No  Family history of diabetes mellitus (DM), Age at diagnosis: Age Unknown  FH: heart disease, Age at diagnosis: Age Unknown    Mother  Still living? Yes, Estimated age: Age Unknown  Family history of diabetes mellitus (DM), Age at diagnosis: Age Unknown

## 2023-11-20 NOTE — OB PST NOTE - PROBLEM SELECTOR PLAN 1
Patient scheduled for surgery on: 23  Patient provided with verbal and written presurgical instructions; verbalized understanding  with teach back on following : Chlorhexidine wash    Instructed to hold prenatals one week prior to  section    Instructed to discuss medications with OB/GYN - follow insulin instructions from OB office

## 2023-11-20 NOTE — OB PST NOTE - NSHPREVIEWOFSYSTEMS_GEN_ALL_CORE
General: Denies fever, chills, sweating, anorexia, weight loss  No polyphagia, polyuria, polydypsia, malaise, or fatigue    Skin: Denies rashes, itching, or dryness. No change in size/color of moles. No tumors, brittle nails, pitted nails, or hair loss    Breast: Denies tenderness, lumps, or nipple discharge      Ophthalmologic: Denies diplopia, photophobia, lacrimation, blurred Vision , or discharge    ENMT Symptoms: Denies hearing difficulty, ear pain, tinnitus, or vertigo. No sinus symptoms, nasal discharge, or nasal obstruction    Respiratory and Thorax: Denies wheezing, dyspnea, cough, hemoptysis, or pleuritic chest pain     Cardiovascular: Bilateral ankle swelling; Denies chest pain, palpitations, dyspnea on exertion, orthopnea, paroxysmal nocturnal dyspnea, or claudication    Gastrointestinal: Denies nausea, vomiting, diarrhea, constipation, change in bowel habits, flatulence, abdominal pain, or melena    Genitourinary/ Pelvis: Denies hematuria, renal colic, or flank pain, urine discoloration, incontinence, dysuria, or urinary hesitancy. Normal urinary frequency. Denies nocturia, Denies abnormal vaginal bleeding, discharge, spotting, pelvic pain, or amniotic fluid leakage    Musculoskeletal: Denies arthralgia, arthritis, joint swelling, muscle cramping, muscle weakness.    Neurological: Denies transient paralysis, weakness, paresthesias, or seizures. Denies syncope, tremors, vertigo, loss of sensation, difficulty walking, loss of consciousness, hemiparesis, confusion, or facial palsy    Psychiatric: Denies history of post- partum depression, suicidal ideation, depression, anxiety, insomnia, memory loss, paranoia, mood swings, agitation, hallucinations, or hyperactivity    Hematology: Denies gum bleeding, nose bleeding, or skin lumps    Lymphatic: Denies enlarged or tender lymph nodes. No extremity swelling    Endocrine: Hx gestational diabetes, preprandial readings 80-90's Denies heat or cold intolerance    Immunologic: Denies recurrent or persistent infections

## 2023-11-22 ENCOUNTER — APPOINTMENT (OUTPATIENT)
Dept: ANTEPARTUM | Facility: CLINIC | Age: 43
End: 2023-11-22

## 2023-11-24 ENCOUNTER — APPOINTMENT (OUTPATIENT)
Dept: CARDIOLOGY | Facility: CLINIC | Age: 43
End: 2023-11-24

## 2023-11-24 ENCOUNTER — APPOINTMENT (OUTPATIENT)
Dept: ANTEPARTUM | Facility: CLINIC | Age: 43
End: 2023-11-24

## 2023-11-24 ENCOUNTER — TRANSCRIPTION ENCOUNTER (OUTPATIENT)
Age: 43
End: 2023-11-24

## 2023-11-25 ENCOUNTER — TRANSCRIPTION ENCOUNTER (OUTPATIENT)
Age: 43
End: 2023-11-25

## 2023-11-25 ENCOUNTER — INPATIENT (INPATIENT)
Facility: HOSPITAL | Age: 43
LOS: 2 days | Discharge: HOME | End: 2023-11-28
Attending: OBSTETRICS & GYNECOLOGY | Admitting: OBSTETRICS & GYNECOLOGY
Payer: SELF-PAY

## 2023-11-25 VITALS — TEMPERATURE: 98 F | RESPIRATION RATE: 18 BRPM

## 2023-11-25 DIAGNOSIS — Z98.891 HISTORY OF UTERINE SCAR FROM PREVIOUS SURGERY: Chronic | ICD-10-CM

## 2023-11-25 DIAGNOSIS — O34.211 MATERNAL CARE FOR LOW TRANSVERSE SCAR FROM PREVIOUS CESAREAN DELIVERY: ICD-10-CM

## 2023-11-25 LAB
BASOPHILS # BLD AUTO: 0.03 K/UL — SIGNIFICANT CHANGE UP (ref 0–0.2)
BASOPHILS # BLD AUTO: 0.03 K/UL — SIGNIFICANT CHANGE UP (ref 0–0.2)
BASOPHILS NFR BLD AUTO: 0.3 % — SIGNIFICANT CHANGE UP (ref 0–2)
BASOPHILS NFR BLD AUTO: 0.3 % — SIGNIFICANT CHANGE UP (ref 0–2)
BLD GP AB SCN SERPL QL: NEGATIVE — SIGNIFICANT CHANGE UP
BLD GP AB SCN SERPL QL: NEGATIVE — SIGNIFICANT CHANGE UP
EOSINOPHIL # BLD AUTO: 0.22 K/UL — SIGNIFICANT CHANGE UP (ref 0–0.5)
EOSINOPHIL # BLD AUTO: 0.22 K/UL — SIGNIFICANT CHANGE UP (ref 0–0.5)
EOSINOPHIL NFR BLD AUTO: 2.1 % — SIGNIFICANT CHANGE UP (ref 0–6)
EOSINOPHIL NFR BLD AUTO: 2.1 % — SIGNIFICANT CHANGE UP (ref 0–6)
GLUCOSE BLDC GLUCOMTR-MCNC: 102 MG/DL — HIGH (ref 70–99)
GLUCOSE BLDC GLUCOMTR-MCNC: 102 MG/DL — HIGH (ref 70–99)
GLUCOSE BLDC GLUCOMTR-MCNC: 114 MG/DL — HIGH (ref 70–99)
GLUCOSE BLDC GLUCOMTR-MCNC: 114 MG/DL — HIGH (ref 70–99)
GLUCOSE BLDC GLUCOMTR-MCNC: 117 MG/DL — HIGH (ref 70–99)
GLUCOSE BLDC GLUCOMTR-MCNC: 117 MG/DL — HIGH (ref 70–99)
GLUCOSE BLDC GLUCOMTR-MCNC: 162 MG/DL — HIGH (ref 70–99)
GLUCOSE BLDC GLUCOMTR-MCNC: 162 MG/DL — HIGH (ref 70–99)
GLUCOSE BLDC GLUCOMTR-MCNC: 89 MG/DL — SIGNIFICANT CHANGE UP (ref 70–99)
GLUCOSE BLDC GLUCOMTR-MCNC: 89 MG/DL — SIGNIFICANT CHANGE UP (ref 70–99)
HCT VFR BLD CALC: 32.4 % — LOW (ref 34.5–45)
HCT VFR BLD CALC: 32.4 % — LOW (ref 34.5–45)
HCT VFR BLD CALC: 35.4 % — SIGNIFICANT CHANGE UP (ref 34.5–45)
HCT VFR BLD CALC: 35.4 % — SIGNIFICANT CHANGE UP (ref 34.5–45)
HGB BLD-MCNC: 10.2 G/DL — LOW (ref 11.5–15.5)
HGB BLD-MCNC: 10.2 G/DL — LOW (ref 11.5–15.5)
HGB BLD-MCNC: 11.3 G/DL — LOW (ref 11.5–15.5)
HGB BLD-MCNC: 11.3 G/DL — LOW (ref 11.5–15.5)
IANC: 7.77 K/UL — HIGH (ref 1.8–7.4)
IANC: 7.77 K/UL — HIGH (ref 1.8–7.4)
IMM GRANULOCYTES NFR BLD AUTO: 0.6 % — SIGNIFICANT CHANGE UP (ref 0–0.9)
IMM GRANULOCYTES NFR BLD AUTO: 0.6 % — SIGNIFICANT CHANGE UP (ref 0–0.9)
LYMPHOCYTES # BLD AUTO: 1.74 K/UL — SIGNIFICANT CHANGE UP (ref 1–3.3)
LYMPHOCYTES # BLD AUTO: 1.74 K/UL — SIGNIFICANT CHANGE UP (ref 1–3.3)
LYMPHOCYTES # BLD AUTO: 16.8 % — SIGNIFICANT CHANGE UP (ref 13–44)
LYMPHOCYTES # BLD AUTO: 16.8 % — SIGNIFICANT CHANGE UP (ref 13–44)
MCHC RBC-ENTMCNC: 28 PG — SIGNIFICANT CHANGE UP (ref 27–34)
MCHC RBC-ENTMCNC: 28 PG — SIGNIFICANT CHANGE UP (ref 27–34)
MCHC RBC-ENTMCNC: 28.3 PG — SIGNIFICANT CHANGE UP (ref 27–34)
MCHC RBC-ENTMCNC: 28.3 PG — SIGNIFICANT CHANGE UP (ref 27–34)
MCHC RBC-ENTMCNC: 31.5 GM/DL — LOW (ref 32–36)
MCHC RBC-ENTMCNC: 31.5 GM/DL — LOW (ref 32–36)
MCHC RBC-ENTMCNC: 31.9 GM/DL — LOW (ref 32–36)
MCHC RBC-ENTMCNC: 31.9 GM/DL — LOW (ref 32–36)
MCV RBC AUTO: 87.6 FL — SIGNIFICANT CHANGE UP (ref 80–100)
MCV RBC AUTO: 87.6 FL — SIGNIFICANT CHANGE UP (ref 80–100)
MCV RBC AUTO: 89.8 FL — SIGNIFICANT CHANGE UP (ref 80–100)
MCV RBC AUTO: 89.8 FL — SIGNIFICANT CHANGE UP (ref 80–100)
MONOCYTES # BLD AUTO: 0.52 K/UL — SIGNIFICANT CHANGE UP (ref 0–0.9)
MONOCYTES # BLD AUTO: 0.52 K/UL — SIGNIFICANT CHANGE UP (ref 0–0.9)
MONOCYTES NFR BLD AUTO: 5 % — SIGNIFICANT CHANGE UP (ref 2–14)
MONOCYTES NFR BLD AUTO: 5 % — SIGNIFICANT CHANGE UP (ref 2–14)
NEUTROPHILS # BLD AUTO: 7.77 K/UL — HIGH (ref 1.8–7.4)
NEUTROPHILS # BLD AUTO: 7.77 K/UL — HIGH (ref 1.8–7.4)
NEUTROPHILS NFR BLD AUTO: 75.2 % — SIGNIFICANT CHANGE UP (ref 43–77)
NEUTROPHILS NFR BLD AUTO: 75.2 % — SIGNIFICANT CHANGE UP (ref 43–77)
NRBC # BLD: 0 /100 WBCS — SIGNIFICANT CHANGE UP (ref 0–0)
NRBC # FLD: 0 K/UL — SIGNIFICANT CHANGE UP (ref 0–0)
PLATELET # BLD AUTO: 255 K/UL — SIGNIFICANT CHANGE UP (ref 150–400)
PLATELET # BLD AUTO: 255 K/UL — SIGNIFICANT CHANGE UP (ref 150–400)
PLATELET # BLD AUTO: 286 K/UL — SIGNIFICANT CHANGE UP (ref 150–400)
PLATELET # BLD AUTO: 286 K/UL — SIGNIFICANT CHANGE UP (ref 150–400)
RBC # BLD: 3.61 M/UL — LOW (ref 3.8–5.2)
RBC # BLD: 3.61 M/UL — LOW (ref 3.8–5.2)
RBC # BLD: 4.04 M/UL — SIGNIFICANT CHANGE UP (ref 3.8–5.2)
RBC # BLD: 4.04 M/UL — SIGNIFICANT CHANGE UP (ref 3.8–5.2)
RBC # FLD: 15.2 % — HIGH (ref 10.3–14.5)
RBC # FLD: 15.2 % — HIGH (ref 10.3–14.5)
RBC # FLD: 15.4 % — HIGH (ref 10.3–14.5)
RBC # FLD: 15.4 % — HIGH (ref 10.3–14.5)
RH IG SCN BLD-IMP: POSITIVE — SIGNIFICANT CHANGE UP
RH IG SCN BLD-IMP: POSITIVE — SIGNIFICANT CHANGE UP
WBC # BLD: 10.34 K/UL — SIGNIFICANT CHANGE UP (ref 3.8–10.5)
WBC # BLD: 10.34 K/UL — SIGNIFICANT CHANGE UP (ref 3.8–10.5)
WBC # BLD: 16.41 K/UL — HIGH (ref 3.8–10.5)
WBC # BLD: 16.41 K/UL — HIGH (ref 3.8–10.5)
WBC # FLD AUTO: 10.34 K/UL — SIGNIFICANT CHANGE UP (ref 3.8–10.5)
WBC # FLD AUTO: 10.34 K/UL — SIGNIFICANT CHANGE UP (ref 3.8–10.5)
WBC # FLD AUTO: 16.41 K/UL — HIGH (ref 3.8–10.5)
WBC # FLD AUTO: 16.41 K/UL — HIGH (ref 3.8–10.5)

## 2023-11-25 DEVICE — SURGICEL SNOW 2 X 4": Type: IMPLANTABLE DEVICE | Status: FUNCTIONAL

## 2023-11-25 RX ORDER — INSULIN LISPRO 100/ML
VIAL (ML) SUBCUTANEOUS AT BEDTIME
Refills: 0 | Status: DISCONTINUED | OUTPATIENT
Start: 2023-11-25 | End: 2023-11-27

## 2023-11-25 RX ORDER — SODIUM CHLORIDE 9 MG/ML
1000 INJECTION, SOLUTION INTRAVENOUS
Refills: 0 | Status: DISCONTINUED | OUTPATIENT
Start: 2023-11-25 | End: 2023-11-28

## 2023-11-25 RX ORDER — DEXTROSE 50 % IN WATER 50 %
25 SYRINGE (ML) INTRAVENOUS ONCE
Refills: 0 | Status: DISCONTINUED | OUTPATIENT
Start: 2023-11-25 | End: 2023-11-28

## 2023-11-25 RX ORDER — SIMETHICONE 80 MG/1
80 TABLET, CHEWABLE ORAL EVERY 4 HOURS
Refills: 0 | Status: DISCONTINUED | OUTPATIENT
Start: 2023-11-25 | End: 2023-11-28

## 2023-11-25 RX ORDER — DEXTROSE 50 % IN WATER 50 %
15 SYRINGE (ML) INTRAVENOUS ONCE
Refills: 0 | Status: DISCONTINUED | OUTPATIENT
Start: 2023-11-25 | End: 2023-11-28

## 2023-11-25 RX ORDER — ACETAMINOPHEN 500 MG
1000 TABLET ORAL EVERY 6 HOURS
Refills: 0 | Status: COMPLETED | OUTPATIENT
Start: 2023-11-25 | End: 2023-11-26

## 2023-11-25 RX ORDER — INSULIN LISPRO 100/ML
VIAL (ML) SUBCUTANEOUS
Refills: 0 | Status: DISCONTINUED | OUTPATIENT
Start: 2023-11-25 | End: 2023-11-27

## 2023-11-25 RX ORDER — SODIUM CHLORIDE 9 MG/ML
1000 INJECTION, SOLUTION INTRAVENOUS ONCE
Refills: 0 | Status: COMPLETED | OUTPATIENT
Start: 2023-11-25 | End: 2023-11-25

## 2023-11-25 RX ORDER — CITRIC ACID/SODIUM CITRATE 300-500 MG
30 SOLUTION, ORAL ORAL ONCE
Refills: 0 | Status: COMPLETED | OUTPATIENT
Start: 2023-11-25 | End: 2023-11-25

## 2023-11-25 RX ORDER — IBUPROFEN 200 MG
600 TABLET ORAL EVERY 6 HOURS
Refills: 0 | Status: COMPLETED | OUTPATIENT
Start: 2023-11-25 | End: 2024-10-23

## 2023-11-25 RX ORDER — MAGNESIUM HYDROXIDE 400 MG/1
30 TABLET, CHEWABLE ORAL
Refills: 0 | Status: DISCONTINUED | OUTPATIENT
Start: 2023-11-25 | End: 2023-11-28

## 2023-11-25 RX ORDER — HEPARIN SODIUM 5000 [USP'U]/ML
5000 INJECTION INTRAVENOUS; SUBCUTANEOUS EVERY 12 HOURS
Refills: 0 | Status: DISCONTINUED | OUTPATIENT
Start: 2023-11-25 | End: 2023-11-28

## 2023-11-25 RX ORDER — TETANUS TOXOID, REDUCED DIPHTHERIA TOXOID AND ACELLULAR PERTUSSIS VACCINE, ADSORBED 5; 2.5; 8; 8; 2.5 [IU]/.5ML; [IU]/.5ML; UG/.5ML; UG/.5ML; UG/.5ML
0.5 SUSPENSION INTRAMUSCULAR ONCE
Refills: 0 | Status: DISCONTINUED | OUTPATIENT
Start: 2023-11-25 | End: 2023-11-28

## 2023-11-25 RX ORDER — KETOROLAC TROMETHAMINE 30 MG/ML
30 SYRINGE (ML) INJECTION ONCE
Refills: 0 | Status: DISCONTINUED | OUTPATIENT
Start: 2023-11-25 | End: 2023-11-25

## 2023-11-25 RX ORDER — ONDANSETRON 8 MG/1
4 TABLET, FILM COATED ORAL ONCE
Refills: 0 | Status: COMPLETED | OUTPATIENT
Start: 2023-11-25 | End: 2023-11-25

## 2023-11-25 RX ORDER — GLUCAGON INJECTION, SOLUTION 0.5 MG/.1ML
1 INJECTION, SOLUTION SUBCUTANEOUS ONCE
Refills: 0 | Status: DISCONTINUED | OUTPATIENT
Start: 2023-11-25 | End: 2023-11-28

## 2023-11-25 RX ORDER — OXYTOCIN 10 UNIT/ML
333.33 VIAL (ML) INJECTION
Qty: 20 | Refills: 0 | Status: DISCONTINUED | OUTPATIENT
Start: 2023-11-25 | End: 2023-11-25

## 2023-11-25 RX ORDER — FAMOTIDINE 10 MG/ML
20 INJECTION INTRAVENOUS ONCE
Refills: 0 | Status: COMPLETED | OUTPATIENT
Start: 2023-11-25 | End: 2023-11-25

## 2023-11-25 RX ORDER — DEXTROSE 50 % IN WATER 50 %
12.5 SYRINGE (ML) INTRAVENOUS ONCE
Refills: 0 | Status: DISCONTINUED | OUTPATIENT
Start: 2023-11-25 | End: 2023-11-28

## 2023-11-25 RX ORDER — OXYCODONE HYDROCHLORIDE 5 MG/1
5 TABLET ORAL
Refills: 0 | Status: DISCONTINUED | OUTPATIENT
Start: 2023-11-25 | End: 2023-11-28

## 2023-11-25 RX ORDER — DIPHENHYDRAMINE HCL 50 MG
25 CAPSULE ORAL EVERY 6 HOURS
Refills: 0 | Status: DISCONTINUED | OUTPATIENT
Start: 2023-11-25 | End: 2023-11-28

## 2023-11-25 RX ORDER — LANOLIN
1 OINTMENT (GRAM) TOPICAL EVERY 6 HOURS
Refills: 0 | Status: DISCONTINUED | OUTPATIENT
Start: 2023-11-25 | End: 2023-11-28

## 2023-11-25 RX ORDER — OXYCODONE HYDROCHLORIDE 5 MG/1
5 TABLET ORAL ONCE
Refills: 0 | Status: DISCONTINUED | OUTPATIENT
Start: 2023-11-25 | End: 2023-11-28

## 2023-11-25 RX ORDER — ACETAMINOPHEN 500 MG
975 TABLET ORAL
Refills: 0 | Status: DISCONTINUED | OUTPATIENT
Start: 2023-11-25 | End: 2023-11-25

## 2023-11-25 RX ORDER — OXYTOCIN 10 UNIT/ML
333.33 VIAL (ML) INJECTION
Qty: 20 | Refills: 0 | Status: DISCONTINUED | OUTPATIENT
Start: 2023-11-25 | End: 2023-11-28

## 2023-11-25 RX ADMIN — Medication 1000 MILLIUNIT(S)/MIN: at 11:52

## 2023-11-25 RX ADMIN — SODIUM CHLORIDE 75 MILLILITER(S): 9 INJECTION, SOLUTION INTRAVENOUS at 11:51

## 2023-11-25 RX ADMIN — Medication 1000 MILLIGRAM(S): at 13:01

## 2023-11-25 RX ADMIN — Medication 30 MILLIGRAM(S): at 21:01

## 2023-11-25 RX ADMIN — Medication 30 MILLILITER(S): at 09:17

## 2023-11-25 RX ADMIN — Medication 30 MILLIGRAM(S): at 21:20

## 2023-11-25 RX ADMIN — SODIUM CHLORIDE 2000 MILLILITER(S): 9 INJECTION, SOLUTION INTRAVENOUS at 09:18

## 2023-11-25 RX ADMIN — Medication 400 MILLIGRAM(S): at 17:14

## 2023-11-25 RX ADMIN — ONDANSETRON 4 MILLIGRAM(S): 8 TABLET, FILM COATED ORAL at 20:12

## 2023-11-25 RX ADMIN — FAMOTIDINE 20 MILLIGRAM(S): 10 INJECTION INTRAVENOUS at 09:17

## 2023-11-25 RX ADMIN — Medication 400 MILLIGRAM(S): at 23:47

## 2023-11-25 RX ADMIN — Medication 400 MILLIGRAM(S): at 11:51

## 2023-11-25 RX ADMIN — SIMETHICONE 80 MILLIGRAM(S): 80 TABLET, CHEWABLE ORAL at 19:54

## 2023-11-25 RX ADMIN — HEPARIN SODIUM 5000 UNIT(S): 5000 INJECTION INTRAVENOUS; SUBCUTANEOUS at 17:14

## 2023-11-25 NOTE — OB RN INTRAOPERATIVE NOTE - NSSELHIDDEN_OBGYN_ALL_OB_FT
[NS_[email or web address]_ALL_OB_FT:[id number]],[NS_DeliveryAssist1_OBGYN_ALL_OB_FT:[id number]],[NS_DeliveryRN_OBGYN_ALL_OB_FT:[id number]]

## 2023-11-25 NOTE — OB RN PATIENT PROFILE - NSICDXPASTMEDICALHX_GEN_ALL_CORE_FT
PAST MEDICAL HISTORY:  AMA (advanced maternal age) multigravida 35+     Anemia     Type 2 diabetes mellitus      (vaginal birth after )

## 2023-11-25 NOTE — OB PROVIDER H&P - HISTORY OF PRESENT ILLNESS
43 year old pregnant patient presents at  37 weeks gestation   ASHLIE: 23  Reports normal fetal movement.   G 3 P 2  Denies pelvic pain, sustained back pain, vaginal bleeding, or leakage of amniotic fluid.    Patient presents for PST evaluation for planned  section with history of , gestational diabetes  Subjective  HPI: 43yoF  at 38w0d presents for scheduled r-LTCS in the setting of poorly controlled T2DM. . +FM. -LOF. -CTXs. -VB. Pt denies any other concerns.    – PNC: Early diagnosis GDM. Pt reports for last 2 weeks all FS >140. Takes Levimir 60u QHS and Aspart 76u at lunch and 78u at dinner. GBS neg.  EFW 3507g w/ AC in 99th%  – OBHx:  #1 - C/S for NRFHT in . 7lbs 4oz   #2 - , 2016 7#    – GynHx: denies  – PMH: Pre-DM   – PSH: denies  – Psych: denies   – Social: denies   – Meds: PNV,  Levimir 60u QHS, Aspart 76u at lunch and 78u at dinner  – Allergies: NKDA  – Will accept blood transfusions? Yes    Objective  Vital Signs Last 24 Hrs  T(C): 36.6 (2023 07:55), Max: 36.6 (2023 07:55)  T(F): 97.88 (2023 07:55), Max: 97.88 (2023 07:55)  HR: 86 (2023 07:56) (86 - 86)  BP: 111/66 (2023 07:56) (111/66 - 111/66)  BP(mean): --  RR: 18 (2023 07:55) (18 - 18)  SpO2: --    – PE:   CV: RRR  Pulm: breathing comfortably on RA  Abd: gravid, nontender  Extr: moving all extremities with ease  – FS: 86   – VE: Deferred   – FHT: baseline 140, mod variability, +accels, -decels  – [place]: None   – EFW: _g by sono  – Sono: vertex with anterior placenta

## 2023-11-25 NOTE — DISCHARGE NOTE OB - MEDICATION SUMMARY - MEDICATIONS TO STOP TAKING
Alert-The patient is alert, awake and responds to voice. The patient is oriented to time, place, and person. The triage nurse is able to obtain subjective information. I will STOP taking the medications listed below when I get home from the hospital:    insulin aspart 100 units/mL injectable solution  -- 78 unit(s) subcutaneously 3 times a day (before meals) 78-84 units sub q 3 times a day before meals    Levemir 100 units/mL subcutaneous solution  -- 64 unit(s) subcutaneous once a day (at bedtime)

## 2023-11-25 NOTE — DISCHARGE NOTE OB - CARE PLAN
Principal Discharge DX:	 delivery delivered  Assessment and plan of treatment:	nothing in the vagina x 6 weeks   no heavy lifting > 10 pounds x 6 weeks   1 Principal Discharge DX:	 delivery delivered  Assessment and plan of treatment:	After discharge, please stay on pelvic rest for 6 weeks, meaning no sexual intercourse, no tampons and no douching.  No driving for 2 weeks as women can loose a lot of blood during delivery and there is a possibility of being lightheaded/fainting.  No lifting objects heavier than baby for two weeks.  Expect to have vaginal bleeding/spotting for up to six weeks.  The bleeding should get lighter and more white/light brown with time.  For bleeding soaking more than a pad an hour or passing clots greater than the size of your fist, come in to the emergency department.    Follow up in OB office in 1-2 weeks for incision check.  Call for noticeable increase in redness or swelling at incision, discharge from incision, or opening of skin at incision site  Secondary Diagnosis:	DM (diabetes mellitus), type 2  Assessment and plan of treatment:	- Stop insulin   - Recommend to start metformin 500mg ER as prescribed with dinner; this can then be adjusted with your doctor as an outpatient based on glucose trends   - Carb consistent diet and exercise as tolerated.     - Glycemic goal of a1c <7% to prevent microvascular complications of diabetes mellitus and reduce the risk of macrovascular complications.  - Check A1c as an outpatient   - Continue Free Style Puja 2 for glucose monitoring  - Please call your doctor if your FS is 70 or below and or 250 and above   - Reviewed importance of glucose monitoring, following a consistent carb diet, and medication adherence  - Please follow up with your PCP, podiatry, and opthalmology as an outpatient    You may call the following number if your desire to begin care with:  [facility]   [facility]  [address] [place] [address] [address], [place], [place] [id number] [phone]   Principal Discharge DX:	 delivery delivered  Assessment and plan of treatment:	After discharge, please stay on pelvic rest for 6 weeks, meaning no sexual intercourse, no tampons and no douching.  No driving for 2 weeks as women can loose a lot of blood during delivery and there is a possibility of being lightheaded/fainting.  No lifting objects heavier than baby for two weeks.  Expect to have vaginal bleeding/spotting for up to six weeks.  The bleeding should get lighter and more white/light brown with time.  For bleeding soaking more than a pad an hour or passing clots greater than the size of your fist, come in to the emergency department.    Follow up in OB office in 1-2 weeks for incision check.  Call for noticeable increase in redness or swelling at incision, discharge from incision, or opening of skin at incision site  Secondary Diagnosis:	DM (diabetes mellitus), type 2  Assessment and plan of treatment:	- Stop insulin   - Recommend to start metformin 500mg ER as prescribed with dinner; this can then be adjusted with your doctor as an outpatient based on glucose trends   - Carb consistent diet and exercise as tolerated.     - Glycemic goal of a1c <7% to prevent microvascular complications of diabetes mellitus and reduce the risk of macrovascular complications.  - Check A1c as an outpatient   - Continue Free Style Puja 2 for glucose monitoring  - Please call your doctor if your FS is 70 or below and or 250 and above   - Reviewed importance of glucose monitoring, following a consistent carb diet, and medication adherence  - Please follow up with your PCP, podiatry, and opthalmology as an outpatient    You may call the following number if your desire to begin care with:  [facility]   [facility]  [address] [place] [address] [address], [place], [place] [id number] [phone]   Principal Discharge DX:	 delivery delivered  Assessment and plan of treatment:	After discharge, please stay on pelvic rest for 6 weeks, meaning no sexual intercourse, no tampons and no douching.  No driving for 2 weeks as women can loose a lot of blood during delivery and there is a possibility of being lightheaded/fainting.  No lifting objects heavier than baby for two weeks.  Expect to have vaginal bleeding/spotting for up to six weeks.  The bleeding should get lighter and more white/light brown with time.  For bleeding soaking more than a pad an hour or passing clots greater than the size of your fist, come in to the emergency department.    Follow up in OB office in 1-2 weeks for incision check.  Call for noticeable increase in redness or swelling at incision, discharge from incision, or opening of skin at incision site  Secondary Diagnosis:	DM (diabetes mellitus), type 2  Assessment and plan of treatment:	- Stop insulin   - Recommend to start metformin 500mg ER as prescribed with dinner; this can then be adjusted with your doctor as an outpatient based on glucose trends   - Carb consistent diet and exercise as tolerated.     - Glycemic goal of a1c <7% to prevent microvascular complications of diabetes mellitus and reduce the risk of macrovascular complications.  - Check A1c as an outpatient   - Continue Free Style Puja 2 for glucose monitoring  - Please call your doctor if your FS is 70 or below and or 250 and above   - Reviewed importance of glucose monitoring, following a consistent carb diet, and medication adherence  - Please follow up with your PCP, podiatry, and opthalmology as an outpatient    You may call the following number if your desire to begin care with:  [facility] Endocrin[facility]   [facility]  [address] [place] [address] [address], [place], [place] [id number] [phone]

## 2023-11-25 NOTE — DISCHARGE NOTE OB - MEDICATION SUMMARY - MEDICATIONS TO TAKE
I will START or STAY ON the medications listed below when I get home from the hospital:    iron 65 mg one tab orally once a day  -- Indication: For Anemia    Prenatal Vitamins  -- 1 tab(s) by mouth once a day  -- Indication: For postpartum    ibuprofen 600 mg oral tablet  -- 1 tab(s) by mouth every 6 hours as needed for  moderate pain  -- Indication: For moderate pain    acetaminophen 325 mg oral tablet  -- 3 tab(s) by mouth every 6 hours as needed for  mild pain  -- Indication: For mild pain    aspirin 81 mg oral tablet  -- 2 tab(s) by mouth once a day  -- Indication: For AMA (advanced maternal age) multigravida 35+    MetFORMIN (Eqv-Glumetza) 500 mg oral tablet, extended release  -- 1 tab(s) by mouth once a day take one pill daily MDD: 1  -- Indication: For Type 2 diabetes mellitus   I will START or STAY ON the medications listed below when I get home from the hospital:    iron 65 mg one tab orally once a day  -- Indication: For Anemia    Prenatal Vitamins  -- 1 tab(s) by mouth once a day  -- Indication: For postpartum    ibuprofen 600 mg oral tablet  -- 1 tab(s) by mouth every 6 hours as needed for  moderate pain  -- Indication: For moderate pain    acetaminophen 325 mg oral tablet  -- 3 tab(s) by mouth every 6 hours as needed for  mild pain  -- Indication: For mild pain    aspirin 81 mg oral tablet  -- 2 tab(s) by mouth once a day  -- Indication: For AMA (advanced maternal age) multigravida 35+    MetFORMIN (Eqv-Glumetza) 500 mg oral tablet, extended release  -- 2 tab(s) by mouth 2 times a day take 1 pill in the moring, Take 1 pill in the evening MDD: 2  -- Indication: For DM (diabetes mellitus), type 2

## 2023-11-25 NOTE — DISCHARGE NOTE OB - NS MD DC FALL RISK RISK
For information on Fall & Injury Prevention, visit: [email or web address]-[email or web address]-mobility OR  [email or web address]/[email or web address]-prevention-tips-to-avoid-injury OR  https://www.cdc.gov/steadi/patient.html For information on Fall & Injury Prevention, visit: [email or web address]://www.[email or web address]/news/fall-prevention-protects-and-maintains-health-and-mobility OR  https://www.[facility]/news/fall-prevention-tips-to-avoid-injury OR  https://www.cdc.gov/steadi/patient.html For information on Fall & Injury Prevention, visit: [email or web address]://www.[email or web address]/news/fall-prevention-protects-and-maintains-health-and-mobility OR  https://www.[email or web address]/news/fall-prevention-tips-[email or web address]-injury OR  https://www.cdc.gov/steadi/patient.html

## 2023-11-25 NOTE — OB NEONATOLOGY/PEDIATRICIAN DELIVERY SUMMARY - BABY A: APGAR 1 MIN REFLEX IRRITABILITY, DELIVERY
What Brings You In Today? (This Is An Xx Year Old Patient Who Presents For...): skin lesion When Did You First Notice It? (The Patient First Noticed It...): one month Where On Your Body Is It? (Located On...): right breast Any Associated Symptoms? (The Symptoms Include.....): new, growing, \"hard\" What Previous Treatments Have You Tried? (The Patient Has Tried The Following Treatments...): no treatment Did Anything Happen To Make You Want To Come In For This Specifically Today? (The Specific Reason That The Patient Came In Today Was Because:): evaluation and management and a FSE\\nShe also has irritating lesions that are catching on her bra (2) cough or sneeze

## 2023-11-25 NOTE — DISCHARGE NOTE OB - PLAN OF CARE
nothing in the vagina x 6 weeks   no heavy lifting > 10 pounds x 6 weeks After discharge, please stay on pelvic rest for 6 weeks, meaning no sexual intercourse, no tampons and no douching.  No driving for 2 weeks as women can loose a lot of blood during delivery and there is a possibility of being lightheaded/fainting.  No lifting objects heavier than baby for two weeks.  Expect to have vaginal bleeding/spotting for up to six weeks.  The bleeding should get lighter and more white/light brown with time.  For bleeding soaking more than a pad an hour or passing clots greater than the size of your fist, come in to the emergency department.    Follow up in OB office in 1-2 weeks for incision check.  Call for noticeable increase in redness or swelling at incision, discharge from incision, or opening of skin at incision site - Stop insulin   - Recommend to start metformin 500mg ER as prescribed with dinner; this can then be adjusted with your doctor as an outpatient based on glucose trends   - Carb consistent diet and exercise as tolerated.     - Glycemic goal of a1c <7% to prevent microvascular complications of diabetes mellitus and reduce the risk of macrovascular complications.  - Check A1c as an outpatient   - Continue Free Style Puja 2 for glucose monitoring  - Please call your doctor if your FS is 70 or below and or 250 and above   - Reviewed importance of glucose monitoring, following a consistent carb diet, and medication adherence  - Please follow up with your PCP, podiatry, and opthalmology as an outpatient    You may call the following number if your desire to begin care with:  [facility] Endocrinology   End[facility]  [address], [address], [place] [address]  [phone] - Stop insulin   - Recommend to start metformin 500mg ER as prescribed with dinner; this can then be adjusted with your doctor as an outpatient based on glucose trends   - Carb consistent diet and exercise as tolerated.     - Glycemic goal of a1c <7% to prevent microvascular complications of diabetes mellitus and reduce the risk of macrovascular complications.  - Check A1c as an outpatient   - Continue Free Style Puja 2 for glucose monitoring  - Please call your doctor if your FS is 70 or below and or 250 and above   - Reviewed importance of glucose monitoring, following a consistent carb diet, and medication adherence  - Please follow up with your PCP, podiatry, and opthalmology as an outpatient    You may call the following number if your desire to begin care with:  [facility] Endocrinology   Endocrine [facility]  [address], [address], [place] [address]  [phone] - Stop insulin   - Recommend to start metformin 500mg ER as prescribed with dinner; this can then be adjusted with your doctor as an outpatient based on glucose trends   - Carb consistent diet and exercise as tolerated.     - Glycemic goal of a1c <7% to prevent microvascular complications of diabetes mellitus and reduce the risk of macrovascular complications.  - Check A1c as an outpatient   - Continue Free Style Puja 2 for glucose monitoring  - Please call your doctor if your FS is 70 or below and or 250 and above   - Reviewed importance of glucose monitoring, following a consistent carb diet, and medication adherence  - Please follow up with your PCP, podiatry, and opthalmology as an outpatient    You may call the following number if your desire to begin care with:  [facility] Endocrinology   Endocrine Faculty Practice  [address], [address], [place] [address]  [phone]

## 2023-11-25 NOTE — OB PROVIDER H&P - ATTENDING COMMENTS
Attending Note   pt presents for rLTCS for poorly controlled diabetic with AC > 99% on fetus  BSUS anterior placenta, vertex   declines tubal ligation  discussed risks fo the surgery including but not limited to bleeding, infection, damage to surrounding organs and damage to    consent signed  TXA prophylactically   2u PRBCs on hold     JOHANNA Zepeda MD

## 2023-11-25 NOTE — OB PROVIDER H&P - ASSESSMENT
Assessment  – Pt with uncontrolled T2DM, presenting for scheduled repeat .     Plan  Admit to LND. Routine Labs. IVF.  For repeat C/S this AM   Fetus: cat 1 tracing. Continuous EFM.   Prenatal issues: T2DM. Initial FS: 86. Fingerstick paneling and rotating fluids per protocol. Endocrine consult PP.   GBS negative   Elevated BMI of 40 with suspected LGA. 2 IV placed. 2 units on hold     Patient discussed with attending physician, Dr. Navi Brady, PGY-3

## 2023-11-25 NOTE — DISCHARGE NOTE OB - CARE PROVIDER_API CALL
Aixa Zepeda  Obstetrics and Gynecology  [address], [address]  [place] [address]  Phone: [phone]  Fax: [phone]  Follow Up Time:    Aixa Zepeda  Obstetrics and Gynecology  [address], [address]  [place] [address]  Phone: [phone]  Fax: [phone]  Follow Up Time:    Aixa Zepeda  Obstetrics and Gynecology  [address], [address]  [place] [address]  Phone: [phone]  Fax: [phone]  Follow Up Time:

## 2023-11-25 NOTE — OB PROVIDER DELIVERY SUMMARY - NSSELHIDDEN_OBGYN_ALL_OB_FT
[NS_DeliveryAttending1_OBGYN_ALL_OB_FT:[id number]],[NS_DeliveryAssist1_OBGYN_ALL_OB_FT:[id number]],[NS_DeliveryRN_OBGYN_ALL_OB_FT:[id number]]

## 2023-11-25 NOTE — DISCHARGE NOTE OB - PATIENT PORTAL LINK FT
You can access the FollowMyHealth Patient Portal offered by [facility] by registering at the following website: [email or web address]. By joining [employer]’s FollowMyHealth portal, you will also be able to view your health information using other applications (apps) compatible with our system. You can access the FollowMyHealth Patient Portal offered by [facility] by registering at the following website: [email or web address]. By joining [employer]’s FollowMyHealth portal, you will also be able to view your health information using other applications (apps) compatible with our system. You can access the FollowMyHealth Patient Portal offered by [facility] by registering at the following website: [email or web address]. By joining [employer]’s FollowMyHealth portal, you will also be able to view your health information using other applications (apps) compatible with our system.

## 2023-11-25 NOTE — OB NEONATOLOGY/PEDIATRICIAN DELIVERY SUMMARY - NSPEDSNEONOTESA_OBGYN_ALL_OB_FT
Baby is a 38.0 wk AGA male born to a 42 y/o  mother via repeat C/S. PEDS called to delivery for at OBGYN request due to anterior placenta and uncontrolled maternal tyle 2 DM. Maternal history of uncontrolled type 2 diabetes on insulin aspart and levemir also taking iron and PNV. Prenatal history of AMA and anterior placenta. Maternal blood type B+. PNL negative, non-reactive, and immune. GBS negative on . AROM at time of delivery on , clear fluids. Baby born vigorous and crying spontaneously. Warmed, dried, stimulated. Apgars 8/9. EOS 0.02. CPAP provided at 9 minutes of life with max setting of 5/21%. CPAP provided for 5 minutes until baby successfully transitioned. Mom plans to breastfeed and consents hepB. Circ requested.   BW: 3160g  : 23  TOB: 09:46

## 2023-11-25 NOTE — OB PROVIDER DELIVERY SUMMARY - NSPROVIDERDELIVERYNOTE_OBGYN_ALL_OB_FT
repeat LTCS  viable male infant, vertex presentation, nuchal x1, weight 3160g (6#15), Apgars 8/9, cord gasses sent  Grossly normal fallopian tubes, uterus, and ovaries  Surgicel powder applied over hysterotomy    QBL: 971cc  IVF: 2800cc  UOP: 100cc    Dictation: #[id number]

## 2023-11-25 NOTE — DISCHARGE NOTE OB - PROVIDER TOKENS
PROVIDER:[TOKEN:[[id number]]] PROVIDER:[TOKEN:[[id number]]] PROVIDER:[TOKEN:[[id number]]]

## 2023-11-25 NOTE — OB RN DELIVERY SUMMARY - NSSELHIDDEN_OBGYN_ALL_OB_FT
[NS_DeliveryAttending1_OBGYN_ALL_OB_FT:[id number]],[NS_DeliveryAssist1_OBGYN_ALL_OB_FT:[id number]],[NS_DeliveryRN_OBGYN_ALL_OB_FT:[id number]]

## 2023-11-26 LAB
BASOPHILS # BLD AUTO: 0.02 K/UL — SIGNIFICANT CHANGE UP (ref 0–0.2)
BASOPHILS # BLD AUTO: 0.02 K/UL — SIGNIFICANT CHANGE UP (ref 0–0.2)
BASOPHILS NFR BLD AUTO: 0.2 % — SIGNIFICANT CHANGE UP (ref 0–2)
BASOPHILS NFR BLD AUTO: 0.2 % — SIGNIFICANT CHANGE UP (ref 0–2)
EOSINOPHIL # BLD AUTO: 0.15 K/UL — SIGNIFICANT CHANGE UP (ref 0–0.5)
EOSINOPHIL # BLD AUTO: 0.15 K/UL — SIGNIFICANT CHANGE UP (ref 0–0.5)
EOSINOPHIL NFR BLD AUTO: 1.2 % — SIGNIFICANT CHANGE UP (ref 0–6)
EOSINOPHIL NFR BLD AUTO: 1.2 % — SIGNIFICANT CHANGE UP (ref 0–6)
GLUCOSE BLDC GLUCOMTR-MCNC: 130 MG/DL — HIGH (ref 70–99)
GLUCOSE BLDC GLUCOMTR-MCNC: 130 MG/DL — HIGH (ref 70–99)
GLUCOSE BLDC GLUCOMTR-MCNC: 147 MG/DL — HIGH (ref 70–99)
GLUCOSE BLDC GLUCOMTR-MCNC: 147 MG/DL — HIGH (ref 70–99)
GLUCOSE BLDC GLUCOMTR-MCNC: 156 MG/DL — HIGH (ref 70–99)
GLUCOSE BLDC GLUCOMTR-MCNC: 156 MG/DL — HIGH (ref 70–99)
GLUCOSE BLDC GLUCOMTR-MCNC: 74 MG/DL — SIGNIFICANT CHANGE UP (ref 70–99)
GLUCOSE BLDC GLUCOMTR-MCNC: 74 MG/DL — SIGNIFICANT CHANGE UP (ref 70–99)
GLUCOSE BLDC GLUCOMTR-MCNC: 76 MG/DL — SIGNIFICANT CHANGE UP (ref 70–99)
GLUCOSE BLDC GLUCOMTR-MCNC: 76 MG/DL — SIGNIFICANT CHANGE UP (ref 70–99)
HCT VFR BLD CALC: 27.6 % — LOW (ref 34.5–45)
HCT VFR BLD CALC: 27.6 % — LOW (ref 34.5–45)
HGB BLD-MCNC: 8.6 G/DL — LOW (ref 11.5–15.5)
HGB BLD-MCNC: 8.6 G/DL — LOW (ref 11.5–15.5)
IANC: 9.92 K/UL — HIGH (ref 1.8–7.4)
IANC: 9.92 K/UL — HIGH (ref 1.8–7.4)
IMM GRANULOCYTES NFR BLD AUTO: 0.5 % — SIGNIFICANT CHANGE UP (ref 0–0.9)
IMM GRANULOCYTES NFR BLD AUTO: 0.5 % — SIGNIFICANT CHANGE UP (ref 0–0.9)
LYMPHOCYTES # BLD AUTO: 1.8 K/UL — SIGNIFICANT CHANGE UP (ref 1–3.3)
LYMPHOCYTES # BLD AUTO: 1.8 K/UL — SIGNIFICANT CHANGE UP (ref 1–3.3)
LYMPHOCYTES # BLD AUTO: 14.3 % — SIGNIFICANT CHANGE UP (ref 13–44)
LYMPHOCYTES # BLD AUTO: 14.3 % — SIGNIFICANT CHANGE UP (ref 13–44)
MCHC RBC-ENTMCNC: 28.1 PG — SIGNIFICANT CHANGE UP (ref 27–34)
MCHC RBC-ENTMCNC: 28.1 PG — SIGNIFICANT CHANGE UP (ref 27–34)
MCHC RBC-ENTMCNC: 31.2 GM/DL — LOW (ref 32–36)
MCHC RBC-ENTMCNC: 31.2 GM/DL — LOW (ref 32–36)
MCV RBC AUTO: 90.2 FL — SIGNIFICANT CHANGE UP (ref 80–100)
MCV RBC AUTO: 90.2 FL — SIGNIFICANT CHANGE UP (ref 80–100)
MONOCYTES # BLD AUTO: 0.62 K/UL — SIGNIFICANT CHANGE UP (ref 0–0.9)
MONOCYTES # BLD AUTO: 0.62 K/UL — SIGNIFICANT CHANGE UP (ref 0–0.9)
MONOCYTES NFR BLD AUTO: 4.9 % — SIGNIFICANT CHANGE UP (ref 2–14)
MONOCYTES NFR BLD AUTO: 4.9 % — SIGNIFICANT CHANGE UP (ref 2–14)
NEUTROPHILS # BLD AUTO: 9.92 K/UL — HIGH (ref 1.8–7.4)
NEUTROPHILS # BLD AUTO: 9.92 K/UL — HIGH (ref 1.8–7.4)
NEUTROPHILS NFR BLD AUTO: 78.9 % — HIGH (ref 43–77)
NEUTROPHILS NFR BLD AUTO: 78.9 % — HIGH (ref 43–77)
NRBC # BLD: 0 /100 WBCS — SIGNIFICANT CHANGE UP (ref 0–0)
NRBC # BLD: 0 /100 WBCS — SIGNIFICANT CHANGE UP (ref 0–0)
NRBC # FLD: 0 K/UL — SIGNIFICANT CHANGE UP (ref 0–0)
NRBC # FLD: 0 K/UL — SIGNIFICANT CHANGE UP (ref 0–0)
PLATELET # BLD AUTO: 238 K/UL — SIGNIFICANT CHANGE UP (ref 150–400)
PLATELET # BLD AUTO: 238 K/UL — SIGNIFICANT CHANGE UP (ref 150–400)
RBC # BLD: 3.06 M/UL — LOW (ref 3.8–5.2)
RBC # BLD: 3.06 M/UL — LOW (ref 3.8–5.2)
RBC # FLD: 15.5 % — HIGH (ref 10.3–14.5)
RBC # FLD: 15.5 % — HIGH (ref 10.3–14.5)
T PALLIDUM AB TITR SER: NEGATIVE — SIGNIFICANT CHANGE UP
T PALLIDUM AB TITR SER: NEGATIVE — SIGNIFICANT CHANGE UP
WBC # BLD: 12.57 K/UL — HIGH (ref 3.8–10.5)
WBC # BLD: 12.57 K/UL — HIGH (ref 3.8–10.5)
WBC # FLD AUTO: 12.57 K/UL — HIGH (ref 3.8–10.5)
WBC # FLD AUTO: 12.57 K/UL — HIGH (ref 3.8–10.5)

## 2023-11-26 PROCEDURE — 99222 1ST HOSP IP/OBS MODERATE 55: CPT

## 2023-11-26 PROCEDURE — 99254 IP/OBS CNSLTJ NEW/EST MOD 60: CPT

## 2023-11-26 RX ORDER — IBUPROFEN 200 MG
600 TABLET ORAL EVERY 6 HOURS
Refills: 0 | Status: DISCONTINUED | OUTPATIENT
Start: 2023-11-26 | End: 2023-11-28

## 2023-11-26 RX ORDER — ACETAMINOPHEN 500 MG
975 TABLET ORAL
Refills: 0 | Status: DISCONTINUED | OUTPATIENT
Start: 2023-11-26 | End: 2023-11-28

## 2023-11-26 RX ADMIN — Medication 975 MILLIGRAM(S): at 12:50

## 2023-11-26 RX ADMIN — Medication 600 MILLIGRAM(S): at 09:30

## 2023-11-26 RX ADMIN — Medication 400 MILLIGRAM(S): at 05:50

## 2023-11-26 RX ADMIN — SIMETHICONE 80 MILLIGRAM(S): 80 TABLET, CHEWABLE ORAL at 05:49

## 2023-11-26 RX ADMIN — Medication 600 MILLIGRAM(S): at 03:45

## 2023-11-26 RX ADMIN — Medication 975 MILLIGRAM(S): at 18:20

## 2023-11-26 RX ADMIN — Medication 600 MILLIGRAM(S): at 22:25

## 2023-11-26 RX ADMIN — HEPARIN SODIUM 5000 UNIT(S): 5000 INJECTION INTRAVENOUS; SUBCUTANEOUS at 17:40

## 2023-11-26 RX ADMIN — Medication 600 MILLIGRAM(S): at 14:34

## 2023-11-26 RX ADMIN — MAGNESIUM HYDROXIDE 30 MILLILITER(S): 400 TABLET, CHEWABLE ORAL at 17:40

## 2023-11-26 RX ADMIN — Medication 600 MILLIGRAM(S): at 15:30

## 2023-11-26 RX ADMIN — SIMETHICONE 80 MILLIGRAM(S): 80 TABLET, CHEWABLE ORAL at 11:58

## 2023-11-26 RX ADMIN — Medication 600 MILLIGRAM(S): at 08:31

## 2023-11-26 RX ADMIN — Medication 975 MILLIGRAM(S): at 17:40

## 2023-11-26 RX ADMIN — Medication 1000 MILLIGRAM(S): at 00:10

## 2023-11-26 RX ADMIN — Medication 1000 MILLIGRAM(S): at 06:20

## 2023-11-26 RX ADMIN — Medication 600 MILLIGRAM(S): at 21:25

## 2023-11-26 RX ADMIN — Medication 600 MILLIGRAM(S): at 03:01

## 2023-11-26 RX ADMIN — HEPARIN SODIUM 5000 UNIT(S): 5000 INJECTION INTRAVENOUS; SUBCUTANEOUS at 05:50

## 2023-11-26 RX ADMIN — Medication 975 MILLIGRAM(S): at 11:54

## 2023-11-26 NOTE — CONSULT NOTE ADULT - SUBJECTIVE AND OBJECTIVE BOX
Reason For Consult: DM    HPI:  44yo w/ history of uncontrolled T2DM on POD#1 s/p LTCS. Her pain is well controlled. She is tolerating a regular diet, not passing flatus yet. Denies N/V. Denies CP/SOB/lightheadedness/dizziness.   She is ambulating without difficulty. Voiding spontaneously.   – PNC: Early diagnosis GDM. Pt reports for last 2 weeks all FS >140. Takes Levimir 60u QHS and Aspart 76u at lunch and 78u at dinner.  endocrine called for DM   she states she had gestational DM with her prior pregnancy  states she never had DM but preDM after those pregnancies   her PCP placed her on metformin last year prior to pregnancy 1000mg for preDM  she was placed on insulin early in pregnancy   she has a strong family hx of DM     pt seen with kids and  at bedside per the pt request               – OBHx:  #1 - C/S for NRFHT in . 7lbs 4oz   #2 - ,  7#    – GynHx: denies  – PMH: Pre-DM   – PSH: denies  – Psych: denies   – Social: denies   – Meds: PNV,  Levimir 60u QHS, Aspart 76u at lunch and 78u at dinner  – Allergies: NKDA  –      PAST MEDICAL & SURGICAL HISTORY:  AMA (advanced maternal age) multigravida 35+      Type 2 diabetes mellitus       (vaginal birth after )      Anemia      H/O  section          FAMILY HISTORY:  Family history of diabetes mellitus (DM) (Father, Mother)    FH: heart disease (Father)        Social History:  2 kids and lives with      Outpatient Medications:  as per HPI    MEDICATIONS  (STANDING):  acetaminophen     Tablet .. 975 milliGRAM(s) Oral <User Schedule>  dextrose 5%. 1000 milliLiter(s) (100 mL/Hr) IV Continuous <Continuous>  dextrose 5%. 1000 milliLiter(s) (50 mL/Hr) IV Continuous <Continuous>  dextrose 50% Injectable 25 Gram(s) IV Push once  dextrose 50% Injectable 12.5 Gram(s) IV Push once  dextrose 50% Injectable 25 Gram(s) IV Push once  diphtheria/tetanus/pertussis (acellular) Vaccine (Adacel) 0.5 milliLiter(s) IntraMuscular once  glucagon  Injectable 1 milliGRAM(s) IntraMuscular once  heparin   Injectable 5000 Unit(s) SubCutaneous every 12 hours  ibuprofen  Tablet. 600 milliGRAM(s) Oral every 6 hours  insulin lispro (ADMELOG) corrective regimen sliding scale   SubCutaneous three times a day before meals  insulin lispro (ADMELOG) corrective regimen sliding scale   SubCutaneous at bedtime  lactated ringers. 1000 milliLiter(s) (125 mL/Hr) IV Continuous <Continuous>  lactated ringers. 1000 milliLiter(s) (75 mL/Hr) IV Continuous <Continuous>  oxytocin Infusion 333.333 milliUNIT(s)/Min (1000 mL/Hr) IV Continuous <Continuous>    MEDICATIONS  (PRN):  dextrose Oral Gel 15 Gram(s) Oral once PRN Blood Glucose LESS THAN 70 milliGRAM(s)/deciliter  diphenhydrAMINE 25 milliGRAM(s) Oral every 6 hours PRN Pruritus  lanolin Ointment 1 Application(s) Topical every 6 hours PRN Sore Nipples  magnesium hydroxide Suspension 30 milliLiter(s) Oral two times a day PRN Constipation  oxyCODONE    IR 5 milliGRAM(s) Oral every 3 hours PRN Moderate to Severe Pain (4-10)  oxyCODONE    IR 5 milliGRAM(s) Oral once PRN Moderate to Severe Pain (4-10)  simethicone 80 milliGRAM(s) Chew every 4 hours PRN Gas      Allergies    No Known Allergies    Intolerances    amoxicillin (Diarrhea)    Review of Systems:  Constitutional: No fever  Eyes: No blurry vision  Neuro: No tremors  HEENT: No pain  Cardiovascular: No chest pain, palpitations  Respiratory: No SOB, no cough  GI: No nausea, vomiting, abdominal pain  : No dysuria  Skin: no rash  Psych: no depression  Endocrine: no polyuria, polydipsia  Hem/lymph: no swelling  Osteoporosis: no fractures    ALL OTHER SYSTEMS REVIEWED AND NEGATIVE      PHYSICAL EXAM:  VITALS: T(C): 36.4 (23 @ 13:37)  T(F): 97.6 (23 @ 13:37), Max: 98.6 (23 @ 02:29)  HR: 80 (23 @ 13:37) (73 - 95)  BP: 107/63 (23 @ 13:37) (105/61 - 116/59)  RR:  (16 - 18)  SpO2:  (97% - 100%)  Wt(kg): --  GENERAL: NAD, well-groomed, well-developed  EYES: No proptosis, no lid lag, anicteric  HEENT:  Atraumatic, Normocephalic, moist mucous membranes  RESPIRATORY: no resp distress   CARDIOVASCULAR: ; no peripheral edema  SKIN: Dry, intact, No rashes or lesions  MUSCULOSKELETAL: Full range of motion,  NEURO: extraocular movements intact, no tremor,   PSYCH: Alert and oriented x 3, normal affect, normal mood      POCT Blood Glucose.: 74 mg/dL (23 @ 11:53)  POCT Blood Glucose.: 76 mg/dL (23 @ 08:04)  POCT Blood Glucose.: 162 mg/dL (23 @ 22:43)  POCT Blood Glucose.: 114 mg/dL (23 @ 17:03)  POCT Blood Glucose.: 102 mg/dL (23 @ 15:27)  POCT Blood Glucose.: 117 mg/dL (23 @ 12:53)  POCT Blood Glucose.: 89 mg/dL (23 @ 07:44)                            8.6    12.57 )-----------( 238      ( 2023 05:20 )             27.6               Thyroid Function Tests:              Radiology:

## 2023-11-26 NOTE — PROGRESS NOTE ADULT - ATTENDING COMMENTS
Patient seen and agree with above.   c/o muscle aches - flatus + void + tolerating po  VSS  Abd soft NT/ND incision c/d/i  Ext - c/c/e NT    POD#1 post RCS  increase ambulation  T2DM - for endocrine consult  JUANCARLOS Castellon

## 2023-11-26 NOTE — CONSULT NOTE ADULT - ASSESSMENT
42yo w/ history of uncontrolled T2DM on POD#1 s/p LTCS. Her pain is well controlled. She is tolerating a regular diet, not passing flatus yet. Denies N/V. Denies CP/SOB/lightheadedness/dizziness.   She is ambulating without difficulty. Voiding spontaneously.   – PNC: Early diagnosis GDM. Pt reports for last 2 weeks all FS >140. Takes Levimir 60u QHS and Aspart 76u at lunch and 78u at dinner.  endocrine called for DM     1. DM  While inpatient  BG target 100-180 mg/dl,  - Admelog  Low dose Correction Scale Premeal & seperate low dose  Correction Scale Bedtime   - Carb Consistent Diet   - Nutrition consult     dc planning  if FSBG remain well controlled inpt <120 without insulin than can have pt check FSBG at home    At home, Patient should check FSBG premeals and bedtime. if they start to increase consistently above  fasting and >120 at home would start metformin 500mg ER as she was prescribed with dinner  check baseline CMP prior to dc if needed to start metformin   this can  than be titrated up with her doctor   had at length conversation with pt and  to fu for DM and monitoring outpt  concern she is at higher risk for DM given hx of gestational DM and preDM prior to pregnancy, and for that matter DM as placed on insulin in the 1st trimester   also would reccomend outpt optho fu   -Discussed with patient the importance of Carb consistent diet and exercise as tolerated.    -Recommend nutritional consultation  inpt prior to dc   -Discussed glycemic goal of a1c <7% to prevent microvascular complications of diabetes mellitus and reduce the risk of macrovascular complications.      If patient wishes to follow up with [facility] Endocrinology     Endocrine [facility]  [address] [place] [address], [address], [place] [address]  [phone]   Please call to schedule appointment with CDE/Nutritionist and MD appointment next available     ENDOCRINE FOLLOW UP  CALL PATIENT ACCESS SERVICES  [phone]  For all [facility] Practices phone numbers and locations throughout [facility], and [place].          2. HTN  goal BP in DM <130/80  outpt mc/cr ratio  currently off agents, monitor         3. HLD  outpt lipid panel       Anjelica Snider MD  Attending Physician   Department of Endocrinology, Diabetes and Metabolism     weekdays: 9am to 5pm: [facility] or [facility] and or TEAMS     Nights and weekends: [phone]  Please note that this patient may be followed by a different provider tomorrow.   If no answer or after hours, please contact [phone].  For final dc reccomendations, please call [phone] or page the endocrine fellow on call.

## 2023-11-27 LAB
ALBUMIN SERPL ELPH-MCNC: 2.7 G/DL — LOW (ref 3.3–5)
ALBUMIN SERPL ELPH-MCNC: 2.7 G/DL — LOW (ref 3.3–5)
ALP SERPL-CCNC: 68 U/L — SIGNIFICANT CHANGE UP (ref 40–120)
ALP SERPL-CCNC: 68 U/L — SIGNIFICANT CHANGE UP (ref 40–120)
ALT FLD-CCNC: 16 U/L — SIGNIFICANT CHANGE UP (ref 4–33)
ALT FLD-CCNC: 16 U/L — SIGNIFICANT CHANGE UP (ref 4–33)
ANION GAP SERPL CALC-SCNC: 8 MMOL/L — SIGNIFICANT CHANGE UP (ref 7–14)
ANION GAP SERPL CALC-SCNC: 8 MMOL/L — SIGNIFICANT CHANGE UP (ref 7–14)
AST SERPL-CCNC: 29 U/L — SIGNIFICANT CHANGE UP (ref 4–32)
AST SERPL-CCNC: 29 U/L — SIGNIFICANT CHANGE UP (ref 4–32)
BILIRUB SERPL-MCNC: <0.2 MG/DL — SIGNIFICANT CHANGE UP (ref 0.2–1.2)
BILIRUB SERPL-MCNC: <0.2 MG/DL — SIGNIFICANT CHANGE UP (ref 0.2–1.2)
BUN SERPL-MCNC: 18 MG/DL — SIGNIFICANT CHANGE UP (ref 7–23)
BUN SERPL-MCNC: 18 MG/DL — SIGNIFICANT CHANGE UP (ref 7–23)
CALCIUM SERPL-MCNC: 8.4 MG/DL — SIGNIFICANT CHANGE UP (ref 8.4–10.5)
CALCIUM SERPL-MCNC: 8.4 MG/DL — SIGNIFICANT CHANGE UP (ref 8.4–10.5)
CHLORIDE SERPL-SCNC: 102 MMOL/L — SIGNIFICANT CHANGE UP (ref 98–107)
CHLORIDE SERPL-SCNC: 102 MMOL/L — SIGNIFICANT CHANGE UP (ref 98–107)
CO2 SERPL-SCNC: 26 MMOL/L — SIGNIFICANT CHANGE UP (ref 22–31)
CO2 SERPL-SCNC: 26 MMOL/L — SIGNIFICANT CHANGE UP (ref 22–31)
CREAT SERPL-MCNC: 0.71 MG/DL — SIGNIFICANT CHANGE UP (ref 0.5–1.3)
CREAT SERPL-MCNC: 0.71 MG/DL — SIGNIFICANT CHANGE UP (ref 0.5–1.3)
EGFR: 107 ML/MIN/1.73M2 — SIGNIFICANT CHANGE UP
EGFR: 107 ML/MIN/1.73M2 — SIGNIFICANT CHANGE UP
GLUCOSE BLDC GLUCOMTR-MCNC: 128 MG/DL — HIGH (ref 70–99)
GLUCOSE BLDC GLUCOMTR-MCNC: 128 MG/DL — HIGH (ref 70–99)
GLUCOSE BLDC GLUCOMTR-MCNC: 189 MG/DL — HIGH (ref 70–99)
GLUCOSE BLDC GLUCOMTR-MCNC: 189 MG/DL — HIGH (ref 70–99)
GLUCOSE BLDC GLUCOMTR-MCNC: 200 MG/DL — HIGH (ref 70–99)
GLUCOSE BLDC GLUCOMTR-MCNC: 200 MG/DL — HIGH (ref 70–99)
GLUCOSE SERPL-MCNC: 103 MG/DL — HIGH (ref 70–99)
GLUCOSE SERPL-MCNC: 103 MG/DL — HIGH (ref 70–99)
POTASSIUM SERPL-MCNC: 3.8 MMOL/L — SIGNIFICANT CHANGE UP (ref 3.5–5.3)
POTASSIUM SERPL-MCNC: 3.8 MMOL/L — SIGNIFICANT CHANGE UP (ref 3.5–5.3)
POTASSIUM SERPL-SCNC: 3.8 MMOL/L — SIGNIFICANT CHANGE UP (ref 3.5–5.3)
POTASSIUM SERPL-SCNC: 3.8 MMOL/L — SIGNIFICANT CHANGE UP (ref 3.5–5.3)
PROT SERPL-MCNC: 5.5 G/DL — LOW (ref 6–8.3)
PROT SERPL-MCNC: 5.5 G/DL — LOW (ref 6–8.3)
SODIUM SERPL-SCNC: 136 MMOL/L — SIGNIFICANT CHANGE UP (ref 135–145)
SODIUM SERPL-SCNC: 136 MMOL/L — SIGNIFICANT CHANGE UP (ref 135–145)

## 2023-11-27 PROCEDURE — 99232 SBSQ HOSP IP/OBS MODERATE 35: CPT

## 2023-11-27 RX ORDER — ASPIRIN/CALCIUM CARB/MAGNESIUM 324 MG
2 TABLET ORAL
Refills: 0 | DISCHARGE

## 2023-11-27 RX ORDER — IBUPROFEN 200 MG
1 TABLET ORAL
Qty: 0 | Refills: 0 | DISCHARGE
Start: 2023-11-27

## 2023-11-27 RX ORDER — METFORMIN HYDROCHLORIDE 850 MG/1
1 TABLET ORAL
Qty: 30 | Refills: 0
Start: 2023-11-27

## 2023-11-27 RX ORDER — INSULIN DETEMIR 100/ML (3)
64 INSULIN PEN (ML) SUBCUTANEOUS
Refills: 0 | DISCHARGE

## 2023-11-27 RX ORDER — METFORMIN HYDROCHLORIDE 850 MG/1
500 TABLET ORAL DAILY
Refills: 0 | Status: DISCONTINUED | OUTPATIENT
Start: 2023-11-27 | End: 2023-11-28

## 2023-11-27 RX ORDER — INSULIN ASPART 100 [IU]/ML
78 INJECTION, SOLUTION SUBCUTANEOUS
Refills: 0 | DISCHARGE

## 2023-11-27 RX ORDER — INSULIN LISPRO 100/ML
VIAL (ML) SUBCUTANEOUS
Refills: 0 | Status: DISCONTINUED | OUTPATIENT
Start: 2023-11-27 | End: 2023-11-28

## 2023-11-27 RX ORDER — INSULIN LISPRO 100/ML
VIAL (ML) SUBCUTANEOUS AT BEDTIME
Refills: 0 | Status: DISCONTINUED | OUTPATIENT
Start: 2023-11-27 | End: 2023-11-28

## 2023-11-27 RX ORDER — ACETAMINOPHEN 500 MG
3 TABLET ORAL
Qty: 0 | Refills: 0 | DISCHARGE
Start: 2023-11-27

## 2023-11-27 RX ADMIN — Medication 600 MILLIGRAM(S): at 15:00

## 2023-11-27 RX ADMIN — METFORMIN HYDROCHLORIDE 500 MILLIGRAM(S): 850 TABLET ORAL at 22:47

## 2023-11-27 RX ADMIN — Medication 975 MILLIGRAM(S): at 13:07

## 2023-11-27 RX ADMIN — Medication 975 MILLIGRAM(S): at 06:50

## 2023-11-27 RX ADMIN — Medication 600 MILLIGRAM(S): at 04:00

## 2023-11-27 RX ADMIN — MAGNESIUM HYDROXIDE 30 MILLILITER(S): 400 TABLET, CHEWABLE ORAL at 09:00

## 2023-11-27 RX ADMIN — Medication 975 MILLIGRAM(S): at 05:41

## 2023-11-27 RX ADMIN — Medication 600 MILLIGRAM(S): at 21:11

## 2023-11-27 RX ADMIN — Medication 600 MILLIGRAM(S): at 10:00

## 2023-11-27 RX ADMIN — Medication 975 MILLIGRAM(S): at 00:08

## 2023-11-27 RX ADMIN — Medication 975 MILLIGRAM(S): at 14:00

## 2023-11-27 RX ADMIN — Medication 975 MILLIGRAM(S): at 19:05

## 2023-11-27 RX ADMIN — Medication 600 MILLIGRAM(S): at 20:41

## 2023-11-27 RX ADMIN — HEPARIN SODIUM 5000 UNIT(S): 5000 INJECTION INTRAVENOUS; SUBCUTANEOUS at 05:41

## 2023-11-27 RX ADMIN — HEPARIN SODIUM 5000 UNIT(S): 5000 INJECTION INTRAVENOUS; SUBCUTANEOUS at 18:08

## 2023-11-27 RX ADMIN — Medication 600 MILLIGRAM(S): at 16:00

## 2023-11-27 RX ADMIN — Medication 975 MILLIGRAM(S): at 18:07

## 2023-11-27 RX ADMIN — Medication 600 MILLIGRAM(S): at 09:00

## 2023-11-27 RX ADMIN — Medication 600 MILLIGRAM(S): at 03:03

## 2023-11-27 NOTE — DIETITIAN INITIAL EVALUATION ADULT - ADD RECOMMEND
1) Monitor weights, PO intake/diet tolerance, skin integrity, pertinent labs.   2) Please consistently document % PO intake in nursing flowsheets to assess adequacy of nutritional intake/monitor need for further nutritional intervention(s).   3) Patient would likely benefit from outpatient RD/CDE visit for ongoing nutrition education, individualized nutritional strategies to improve glycemic control,  and to help ensure long-term adherence to nutritional recommendations.

## 2023-11-27 NOTE — PROGRESS NOTE ADULT - ATTENDING COMMENTS
OBGYN Attending    Pt seen and examined at bedside.  Agree with above.  Doing well POD#2.  Patient ambulating, tolerating regular diet w/o nausea/vomiting, voiding w/o difficulty, passing flatus.  Pain is controlled.    Abdomen benign and minimally tender   Incision c/d/i   Ext NT b/l     A/P: 42yo w/ history of T2DM, on POD#2 s/p rLTCS.  Patient is stable and doing well post-operatively.  -Appreciate endo recs.   BG in target range.  Will touch base with endocrine regarding discharge instructions and follow up.   - will check CMP  - Continue regular diet.  - Increase ambulation.  - HSQ, venodynes for DVT prophylaxis  - Continue motrin, tylenol, oxycodone PRN for pain control  -Discharge planning pending matheus Valentino MD

## 2023-11-27 NOTE — DIETITIAN INITIAL EVALUATION ADULT - LITERATURE/VIDEOS GIVEN
Diabetes and Nutrition ([facility]); Plate Method for Diabetes; Carbohydrate Counting for people with diabetes

## 2023-11-27 NOTE — DIETITIAN INITIAL EVALUATION ADULT - OTHER INFO
RD visited with patient for requested consult.     44yo w/ history of T2DM, on POD#2 s/p rLTCS.     Despite noted history, patient denies previous h/o DM; stated she was pre-diabetic prior to this last pregnancy.  Patient initially unable to identify / name carbohydrate containing foods.  RD extensively reviewed carbohydrate food sources with patient and discussed appropriate portion sizes of various carbohydrate foods. RD reviewed plate method with patient and introduced carbohydrate counting concepts. Emphasized importance of consistent meal intake, mixed meals, fiber foods, complex carbs to incorporate into diet to help control BG; provided Written materials also given for reinforcement. Patient with CGM and monitors BG trends regularly.

## 2023-11-27 NOTE — DIETITIAN INITIAL EVALUATION ADULT - CALCULATED TO (G/KG)
Patient evaluated here a few days ago for a fall. Prescribed meds for back pain; no relief. States the pain became worse to her lower middle back last night and now radiates around to her left lower abdomen. Denies g/u complaints. Also c/o emesis. Hyperventilating on arrival.   
110.6

## 2023-11-27 NOTE — DIETITIAN INITIAL EVALUATION ADULT - PERTINENT LABORATORY DATA
CAPILLARY BLOOD GLUCOSE  POCT Blood Glucose.: 147 mg/dL (26 Nov 2023 22:02)  POCT Blood Glucose.: 156 mg/dL (26 Nov 2023 22:01)  POCT Blood Glucose.: 130 mg/dL (26 Nov 2023 16:43)  POCT Blood Glucose.: 74 mg/dL (26 Nov 2023 11:53)

## 2023-11-27 NOTE — DIETITIAN INITIAL EVALUATION ADULT - PERTINENT MEDS FT
MEDICATIONS  (STANDING):  acetaminophen     Tablet .. 975 milliGRAM(s) Oral <User Schedule>  dextrose 5%. 1000 milliLiter(s) (100 mL/Hr) IV Continuous <Continuous>  dextrose 5%. 1000 milliLiter(s) (50 mL/Hr) IV Continuous <Continuous>  dextrose 50% Injectable 25 Gram(s) IV Push once  dextrose 50% Injectable 25 Gram(s) IV Push once  dextrose 50% Injectable 12.5 Gram(s) IV Push once  diphtheria/tetanus/pertussis (acellular) Vaccine (Adacel) 0.5 milliLiter(s) IntraMuscular once  glucagon  Injectable 1 milliGRAM(s) IntraMuscular once  heparin   Injectable 5000 Unit(s) SubCutaneous every 12 hours  ibuprofen  Tablet. 600 milliGRAM(s) Oral every 6 hours  insulin lispro (ADMELOG) corrective regimen sliding scale   SubCutaneous three times a day before meals  insulin lispro (ADMELOG) corrective regimen sliding scale   SubCutaneous at bedtime  lactated ringers. 1000 milliLiter(s) (75 mL/Hr) IV Continuous <Continuous>  lactated ringers. 1000 milliLiter(s) (125 mL/Hr) IV Continuous <Continuous>  oxytocin Infusion 333.333 milliUNIT(s)/Min (1000 mL/Hr) IV Continuous <Continuous>    MEDICATIONS  (PRN):  dextrose Oral Gel 15 Gram(s) Oral once PRN Blood Glucose LESS THAN 70 milliGRAM(s)/deciliter  diphenhydrAMINE 25 milliGRAM(s) Oral every 6 hours PRN Pruritus  lanolin Ointment 1 Application(s) Topical every 6 hours PRN Sore Nipples  magnesium hydroxide Suspension 30 milliLiter(s) Oral two times a day PRN Constipation  oxyCODONE    IR 5 milliGRAM(s) Oral once PRN Moderate to Severe Pain (4-10)  oxyCODONE    IR 5 milliGRAM(s) Oral every 3 hours PRN Moderate to Severe Pain (4-10)  simethicone 80 milliGRAM(s) Chew every 4 hours PRN Gas

## 2023-11-28 VITALS
OXYGEN SATURATION: 100 % | SYSTOLIC BLOOD PRESSURE: 111 MMHG | RESPIRATION RATE: 17 BRPM | DIASTOLIC BLOOD PRESSURE: 59 MMHG | TEMPERATURE: 99 F | HEART RATE: 82 BPM

## 2023-11-28 LAB
GLUCOSE BLDC GLUCOMTR-MCNC: 92 MG/DL — SIGNIFICANT CHANGE UP (ref 70–99)
GLUCOSE BLDC GLUCOMTR-MCNC: 92 MG/DL — SIGNIFICANT CHANGE UP (ref 70–99)

## 2023-11-28 PROCEDURE — 99232 SBSQ HOSP IP/OBS MODERATE 35: CPT

## 2023-11-28 RX ORDER — METFORMIN HYDROCHLORIDE 850 MG/1
2 TABLET ORAL
Qty: 60 | Refills: 0
Start: 2023-11-28

## 2023-11-28 RX ADMIN — Medication 975 MILLIGRAM(S): at 05:44

## 2023-11-28 RX ADMIN — Medication 975 MILLIGRAM(S): at 00:36

## 2023-11-28 RX ADMIN — Medication 975 MILLIGRAM(S): at 11:56

## 2023-11-28 RX ADMIN — Medication 600 MILLIGRAM(S): at 02:55

## 2023-11-28 RX ADMIN — Medication 600 MILLIGRAM(S): at 02:25

## 2023-11-28 RX ADMIN — HEPARIN SODIUM 5000 UNIT(S): 5000 INJECTION INTRAVENOUS; SUBCUTANEOUS at 05:44

## 2023-11-28 RX ADMIN — Medication 975 MILLIGRAM(S): at 00:06

## 2023-11-28 RX ADMIN — Medication 600 MILLIGRAM(S): at 09:30

## 2023-11-28 RX ADMIN — Medication 600 MILLIGRAM(S): at 08:40

## 2023-11-28 NOTE — PROGRESS NOTE ADULT - ASSESSMENT
A/P: 42yo w/ history of T2DM, on POD#1 s/p LTCS.  Patient is stable and doing well post-operatively.  \    #T2DM  - on ISS   - endocrine consult today to determine recs for outpatient management    #postpartum  - Hct 35.4->32.4->27.6, not symptomatic and feels well  - Continue regular diet.  - Increase ambulation.  - HSQ, venodynes for DVT prophylaxis  - Continue motrin, tylenol, oxycodone PRN for pain control      Adore Bone, PGY1  
44yo w/ history of uncontrolled T2DM on POD#1 s/p LTCS. Her pain is well controlled. She is tolerating a regular diet, not passing flatus yet. Denies N/V. Denies CP/SOB/lightheadedness/dizziness.   She is ambulating without difficulty. Voiding spontaneously.   – PNC: Early diagnosis GDM. Pt reports for last 2 weeks all FS >140. Takes Levimir 60u QHS and Aspart 76u at lunch and 78u at dinner.  endocrine called for DM     1. Gestational DM  concern she is at higher risk for DM given hx of gestational DM and preDM prior to pregnancy, and for that matter DM as placed on insulin in the 1st trimester   While inpatient  BG target 100-180 mg/dl: above goal yesterday evening   - Admelog Low dose Correction Scale Premeal & seperate low dose  Correction Scale Bedtime   - Carb Consistent Diet   - FS before meals and at bedtime   - Nutrition consult     dc planning  - Stop insulin   - Recommend to start metformin 500mg ER BID with meals as she was on this regimen prior to pregnancy; this can than be titrated up with her doctor as an outpt based on glucose trends   - Discussed with patient the importance of Carb consistent diet and exercise as tolerated.    - Recommend nutritional consultation inpt prior to dc   - Discussed glycemic goal of a1c <7% to prevent microvascular complications of diabetes mellitus and reduce the risk of macrovascular complications.  - Check A1c as an outpt   - Continue Free Style Puja 2 for glucose monitoring (please send prescription for sensors as per pts request )  - Please call your doctor if your FS is 70 or below and or 250 and above   - Reviewed importance of glucose monitoring, following a consistent carb diet, and medication adherence  - Hypoglycemia and intervention reviewed   - Please follow up with your pcp, podiatry, and opthalmology as an outpt   - Ensure patient has working glucometer, test strips, lancets, alcohol pads, and BD glenda pen needles  - Please obtain OGTT in 6 weeks to determine baseline A1c   - Please also prescribe glucose tabs, Baqsimi nasal spray or glucagon emergency kit for hypoglycemia risk   If patient wishes to follow up with [facility]   [facility]  [address] [place] [address], [address], [place] [address]  [phone]   Please call to schedule appointment with CDE/Nutritionist and MD appointment next available     ENDOCRINE FOLLOW UP  CALL PATIENT ACCESS SERVICES  [phone]  For all [facility] Practices phone numbers and locations throughout [employer], and [place].      2. HTN  goal BP in DM <130/80  Please obtain urine micro albumin cr ratio as an outpt   currently off agents, monitor     3. HLD  Modify lifestyle with diet and exercise   outpt lipid panel     D/w Primary team     Chana Thomas  Nurse Practitioner  Division of Endocrinology & Diabetes  In house pager #[phone]    If before 9AM or after 6PM, or on weekends/holidays, please call endocrine answering service for assistance ([phone]).For nonurgent matters email [email or web address] for assistance.   
A/P: 42yo w/ history of T2DM, on POD#2 s/p rLTCS.  Patient is stable and doing well post-operatively.    #T2DM  - on mISS  - FS:  on 11/26. Continue to monitor today.  - Endocrine Recs: (appreciate consult)       - Inpatient BG target 100-180 mg/dl.        - Admelog Low dose Correction Scale pre-meal & separate low dose  Correction Scale Bedtime. Carb Consistent Diet. Nutrition consult.        - D/C planning: if FS <120 w/o insulin then pt to check FS at home pre-meal and at bedtime. If > fasting and >120 at home, start metformin 500mg with dinner.       - check baseline CMP prior to d/c if planning to start metformin    #postpartum  - Hct 35.4->32.4->27.6, asymptomatic  - Continue regular diet.  - Increase ambulation.  - HSQ, venodynes for DVT prophylaxis  - Continue motrin, tylenol, oxycodone PRN for pain control    Genesis Brink, PGY1
A/P: 44yo w/ history of T2DM, on POD#3 s/p rLTCS.  Patient is stable and doing well post-operatively.    #T2DM  - mISS while inpatient   - FS: 128-200 on 11/27. Started Metformin 500mg last night.   - Endocrine Recs: (appreciate consult): start Metformin 500mg with dinner, titrate outpatient.     #postpartum  - Hct 35.4->32.4->27.6, asymptomatic  - Continue regular diet.  - Increase ambulation.  - HSQ, venodynes for DVT prophylaxis  - Continue motrin, tylenol, oxycodone PRN for pain control    Genesis Brink, PGY1  
42yo w/ history of uncontrolled T2DM on POD#1 s/p LTCS. Her pain is well controlled. She is tolerating a regular diet, not passing flatus yet. Denies N/V. Denies CP/SOB/lightheadedness/dizziness.   She is ambulating without difficulty. Voiding spontaneously.   – PNC: Early diagnosis GDM. Pt reports for last 2 weeks all FS >140. Takes Levimir 60u QHS and Aspart 76u at lunch and 78u at dinner.  endocrine called for DM     1. DM  While inpatient  BG target 100-180 mg/dl: stable today  - Admelog Low dose Correction Scale Premeal & seperate low dose  Correction Scale Bedtime   - Carb Consistent Diet   - FS before meals and at bedtime   - Nutrition consult     dc planning  - Stop insulin   - Recommend to start metformin 500mg ER as she was prescribed with dinner; this can  than be titrated up with her doctor as an outpt based on glucose trends   - Discussed with patient the importance of Carb consistent diet and exercise as tolerated.    - Recommend nutritional consultation inpt prior to dc   - Discussed glycemic goal of a1c <7% to prevent microvascular complications of diabetes mellitus and reduce the risk of macrovascular complications.  - Check A1c as an outpt   - Continue Free Style Puja 2 for glucose monitoring (please send prescription for sensors as per pts request )  - Please call your doctor if your FS is 70 or below and or 250 and above   - Reviewed importance of glucose monitoring, following a consistent carb diet, and medication adherence  - Hypoglycemia and intervention reviewed   - Please follow up with your pcp, podiatry, and opthalmology as an outpt   - Ensure patient has working glucometer, test strips, lancets, alcohol pads, and BD glenda pen needles  - Please also prescribe glucose tabs, Baqsimi nasal spray or glucagon emergency kit for hypoglycemia risk   If patient wishes to follow up with [facility]   [facility]  [address], [address], [place] [address]  [phone]   Please call to schedule appointment with CDE/Nutritionist and MD appointment next available     ENDOCRINE FOLLOW UP  CALL PATIENT ACCESS SERVICES  [phone]  For all [facility] Endocrine Practices phone numbers and locations throughout [facility], and [place].      2. HTN  goal BP in DM <130/80  Please obtain urine micro albumin cr ratio as an outpt   currently off agents, monitor     3. HLD  Modify lifestyle with diet and exercise   outpt lipid panel     D/w Primary team     Chana Thomas  Nurse Practitioner  Division of Endocrinology & Diabetes  In house pager #[phone]    If before 9AM or after 6PM, or on weekends/holidays, please call endocrine answering service for assistance ([phone]).For nonurgent matters email [email or web address] for assistance.

## 2023-11-28 NOTE — PROGRESS NOTE ADULT - SUBJECTIVE AND OBJECTIVE BOX
Chief Complaint: Gestational DM     History: Pt seen at bedside. Pt tolerating oral diet. Pt denies nausea and vomiting/any signs of hypoglycemia. Pt reports an adequate appetite.     MEDICATIONS  (STANDING):  acetaminophen     Tablet .. 975 milliGRAM(s) Oral <User Schedule>  dextrose 5%. 1000 milliLiter(s) (100 mL/Hr) IV Continuous <Continuous>  dextrose 5%. 1000 milliLiter(s) (50 mL/Hr) IV Continuous <Continuous>  dextrose 50% Injectable 25 Gram(s) IV Push once  dextrose 50% Injectable 12.5 Gram(s) IV Push once  dextrose 50% Injectable 25 Gram(s) IV Push once  diphtheria/tetanus/pertussis (acellular) Vaccine (Adacel) 0.5 milliLiter(s) IntraMuscular once  glucagon  Injectable 1 milliGRAM(s) IntraMuscular once  heparin   Injectable 5000 Unit(s) SubCutaneous every 12 hours  ibuprofen  Tablet. 600 milliGRAM(s) Oral every 6 hours  insulin lispro (ADMELOG) corrective regimen sliding scale   SubCutaneous three times a day before meals  insulin lispro (ADMELOG) corrective regimen sliding scale   SubCutaneous at bedtime  lactated ringers. 1000 milliLiter(s) (125 mL/Hr) IV Continuous <Continuous>  lactated ringers. 1000 milliLiter(s) (75 mL/Hr) IV Continuous <Continuous>  oxytocin Infusion 333.333 milliUNIT(s)/Min (1000 mL/Hr) IV Continuous <Continuous>    MEDICATIONS  (PRN):  dextrose Oral Gel 15 Gram(s) Oral once PRN Blood Glucose LESS THAN 70 milliGRAM(s)/deciliter  diphenhydrAMINE 25 milliGRAM(s) Oral every 6 hours PRN Pruritus  lanolin Ointment 1 Application(s) Topical every 6 hours PRN Sore Nipples  magnesium hydroxide Suspension 30 milliLiter(s) Oral two times a day PRN Constipation  oxyCODONE    IR 5 milliGRAM(s) Oral every 3 hours PRN Moderate to Severe Pain (4-10)  oxyCODONE    IR 5 milliGRAM(s) Oral once PRN Moderate to Severe Pain (4-10)  simethicone 80 milliGRAM(s) Chew every 4 hours PRN Gas      Allergies: No Known Allergies    Intolerances: amoxicillin (Diarrhea)    Review of Systems:  Respiratory: No SOB, no cough  GI: No nausea, vomiting, abdominal pain  Endocrine: no polyuria, polydipsia    PHYSICAL EXAM:  VITALS: T(C): 36.6 (11-27-23 @ 13:51)  T(F): 97.9 (11-27-23 @ 13:51), Max: 98.2 (11-26-23 @ 22:13)  HR: 91 (11-27-23 @ 13:51) (84 - 91)  BP: 121/61 (11-27-23 @ 13:51) (119/58 - 121/61)  RR:  (16 - 17)  SpO2:  (99% - 100%)  Wt(kg): --  GENERAL: NAD, well-groomed, well-developed  RESPIRATORY: No labored breathing   GI: Soft, nontender, non distended  PSYCH: Alert and oriented x 3, normal affect, normal mood      CAPILLARY BLOOD GLUCOSE  POCT Blood Glucose.: 147 mg/dL (26 Nov 2023 22:02)  POCT Blood Glucose.: 156 mg/dL (26 Nov 2023 22:01)  POCT Blood Glucose.: 130 mg/dL (26 Nov 2023 16:43)      11-27    136  |  102  |  18  ----------------------------<  103<H>  3.8   |  26  |  0.71    eGFR: 107    Ca    8.4      11-27    TPro  5.5<L>  /  Alb  2.7<L>  /  TBili  <0.2  /  DBili  x   /  AST  29  /  ALT  16  /  AlkPhos  68  11-27      Diet, Consistent Carbohydrate/No Snacks (11-25-23 @ 17:31) [Active]                        
OB Progress Note: LTCS, POD#3    S: 42yo POD#3 s/p pLTCS. Pain is well controlled. She is tolerating a regular diet and passing flatus. She is voiding spontaneously, and ambulating without difficulty. Endorses light vaginal bleeding, soaking <1 pad/hr. Denies CP/SOB. Denies lightheadedness/dizziness. Denies N/V.    O:  Vitals:  Vital Signs Last 24 Hrs  T(C): 36.8 (28 Nov 2023 05:23), Max: 36.8 (28 Nov 2023 05:23)  T(F): 98.2 (28 Nov 2023 05:23), Max: 98.2 (28 Nov 2023 05:23)  HR: 82 (28 Nov 2023 05:23) (82 - 91)  BP: 100/54 (28 Nov 2023 05:23) (100/54 - 123/65)  BP(mean): --  RR: 17 (28 Nov 2023 05:23) (16 - 17)  SpO2: 100% (28 Nov 2023 05:23) (100% - 100%)    Parameters below as of 28 Nov 2023 05:23  Patient On (Oxygen Delivery Method): room air        MEDICATIONS  (STANDING):  acetaminophen     Tablet .. 975 milliGRAM(s) Oral <User Schedule>  dextrose 5%. 1000 milliLiter(s) (100 mL/Hr) IV Continuous <Continuous>  dextrose 5%. 1000 milliLiter(s) (50 mL/Hr) IV Continuous <Continuous>  dextrose 50% Injectable 25 Gram(s) IV Push once  dextrose 50% Injectable 12.5 Gram(s) IV Push once  dextrose 50% Injectable 25 Gram(s) IV Push once  diphtheria/tetanus/pertussis (acellular) Vaccine (Adacel) 0.5 milliLiter(s) IntraMuscular once  glucagon  Injectable 1 milliGRAM(s) IntraMuscular once  heparin   Injectable 5000 Unit(s) SubCutaneous every 12 hours  ibuprofen  Tablet. 600 milliGRAM(s) Oral every 6 hours  insulin lispro (ADMELOG) corrective regimen sliding scale   SubCutaneous three times a day before meals  insulin lispro (ADMELOG) corrective regimen sliding scale   SubCutaneous at bedtime  lactated ringers. 1000 milliLiter(s) (75 mL/Hr) IV Continuous <Continuous>  lactated ringers. 1000 milliLiter(s) (125 mL/Hr) IV Continuous <Continuous>  metFORMIN 500 milliGRAM(s) Oral daily  oxytocin Infusion 333.333 milliUNIT(s)/Min (1000 mL/Hr) IV Continuous <Continuous>      MEDICATIONS  (PRN):  dextrose Oral Gel 15 Gram(s) Oral once PRN Blood Glucose LESS THAN 70 milliGRAM(s)/deciliter  diphenhydrAMINE 25 milliGRAM(s) Oral every 6 hours PRN Pruritus  lanolin Ointment 1 Application(s) Topical every 6 hours PRN Sore Nipples  magnesium hydroxide Suspension 30 milliLiter(s) Oral two times a day PRN Constipation  oxyCODONE    IR 5 milliGRAM(s) Oral once PRN Moderate to Severe Pain (4-10)  oxyCODONE    IR 5 milliGRAM(s) Oral every 3 hours PRN Moderate to Severe Pain (4-10)  simethicone 80 milliGRAM(s) Chew every 4 hours PRN Gas      Labs:  Blood type: B Positive  Rubella IgG: RPR: Negative                          8.6<L>   12.57<H> >-----------< 238    ( 11-26 @ 05:20 )             27.6<L>                        10.2<L>   16.41<H> >-----------< 255    ( 11-25 @ 16:45 )             32.4<L>                        11.3<L>   10.34 >-----------< 286    ( 11-25 @ 07:58 )             35.4    11-27-23 @ 12:18      136  |  102  |  18  ----------------------------<  103<H>  3.8   |  26  |  0.71        Ca    8.4      27 Nov 2023 12:18    TPro  5.5<L>  /  Alb  2.7<L>  /  TBili  <0.2  /  DBili  x   /  AST  29  /  ALT  16  /  AlkPhos  68  11-27-23 @ 12:18          PE:  General: NAD  Heart: extremities well-perfused  Lungs: breathing normally  Abdomen: Soft, appropriately tender, incision c/d/i, fundus firm  Extremities: No erythema, no pitting edema  Gyn: lochia wnl    A/P: 42yo POD#3 s/p LTCS.  Patient is stable and doing well post-operatively.    - Continue regular diet.  - Increase ambulation.  - Continue motrin, tylenol, oxycodone PRN for pain control.    Genesis Brink, PGY1
ANESTHESIA POSTOP CHECK    43y Female POSTOP DAY 1     Mild Pruritis    NO APPARENT ANESTHESIA COMPLICATIONS      
OB Progress Note:  Delivery, POD#1    S: 42yo w/ history of uncontrolled T2DM on POD#1 s/p LTCS. Her pain is well controlled. She is tolerating a regular diet, not passing flatus yet. Denies N/V. Denies CP/SOB/lightheadedness/dizziness.   She is ambulating without difficulty. Voiding spontaneously.       O:   Vital Signs Last 24 Hrs  T(C): 36.5 (2023 05:54), Max: 37 (2023 02:29)  T(F): 97.7 (2023 05:54), Max: 98.6 (2023 02:29)  HR: 73 (2023 05:54) (70 - 90)  BP: 105/61 (2023 05:54) (99/70 - 144/70)  BP(mean): 62 (2023 14:30) (47 - 90)  RR: 16 (2023 05:54) (14 - 26)  SpO2: 100% (2023 05:54) (95% - 100%)    Parameters below as of 2023 05:54  Patient On (Oxygen Delivery Method): room air        Labs:  Blood type: B Positive  Rubella IgG: RPR: Negative                          8.6<L>   12.57<H> >-----------< 238    (  @ 05:20 )             27.6<L>                        10.2<L>   16.41<H> >-----------< 255    (  @ 16:45 )             32.4<L>                        11.3<L>   10.34 >-----------< 286    (  @ 07:58 )             35.4                  PE:  General: NAD  Abdomen: Mildly distended, appropriately tender, incision c/d/i.  Extremities: No erythema, no pitting edema  
OB Progress Note: LTCS, POD#2    S: 42yo POD#2 s/p rLTCS. Pain is well controlled. She is tolerating a regular diet and passing flatus. She is voiding spontaneously, and ambulating without difficulty. Endorses light vaginal bleeding, soaking <1 pad/hr. Denies CP/SOB. Denies lightheadedness/dizziness. Denies N/V.    O:  Vitals:  Vital Signs Last 24 Hrs  T(C): 36.6 (27 Nov 2023 05:26), Max: 36.8 (26 Nov 2023 09:27)  T(F): 97.9 (27 Nov 2023 05:26), Max: 98.2 (26 Nov 2023 09:27)  HR: 88 (27 Nov 2023 05:26) (80 - 95)  BP: 120/62 (27 Nov 2023 05:26) (107/63 - 120/62)  BP(mean): --  RR: 17 (27 Nov 2023 05:26) (16 - 18)  SpO2: 100% (27 Nov 2023 05:26) (99% - 100%)    Parameters below as of 27 Nov 2023 05:26  Patient On (Oxygen Delivery Method): room air        MEDICATIONS  (STANDING):  acetaminophen     Tablet .. 975 milliGRAM(s) Oral <User Schedule>  dextrose 5%. 1000 milliLiter(s) (100 mL/Hr) IV Continuous <Continuous>  dextrose 5%. 1000 milliLiter(s) (50 mL/Hr) IV Continuous <Continuous>  dextrose 50% Injectable 25 Gram(s) IV Push once  dextrose 50% Injectable 12.5 Gram(s) IV Push once  dextrose 50% Injectable 25 Gram(s) IV Push once  diphtheria/tetanus/pertussis (acellular) Vaccine (Adacel) 0.5 milliLiter(s) IntraMuscular once  glucagon  Injectable 1 milliGRAM(s) IntraMuscular once  heparin   Injectable 5000 Unit(s) SubCutaneous every 12 hours  ibuprofen  Tablet. 600 milliGRAM(s) Oral every 6 hours  insulin lispro (ADMELOG) corrective regimen sliding scale   SubCutaneous three times a day before meals  insulin lispro (ADMELOG) corrective regimen sliding scale   SubCutaneous at bedtime  lactated ringers. 1000 milliLiter(s) (125 mL/Hr) IV Continuous <Continuous>  lactated ringers. 1000 milliLiter(s) (75 mL/Hr) IV Continuous <Continuous>  oxytocin Infusion 333.333 milliUNIT(s)/Min (1000 mL/Hr) IV Continuous <Continuous>      MEDICATIONS  (PRN):  dextrose Oral Gel 15 Gram(s) Oral once PRN Blood Glucose LESS THAN 70 milliGRAM(s)/deciliter  diphenhydrAMINE 25 milliGRAM(s) Oral every 6 hours PRN Pruritus  lanolin Ointment 1 Application(s) Topical every 6 hours PRN Sore Nipples  magnesium hydroxide Suspension 30 milliLiter(s) Oral two times a day PRN Constipation  oxyCODONE    IR 5 milliGRAM(s) Oral every 3 hours PRN Moderate to Severe Pain (4-10)  oxyCODONE    IR 5 milliGRAM(s) Oral once PRN Moderate to Severe Pain (4-10)  simethicone 80 milliGRAM(s) Chew every 4 hours PRN Gas      Labs:  Blood type: B Positive  Rubella IgG: RPR: Negative                          8.6<L>   12.57<H> >-----------< 238    ( 11-26 @ 05:20 )             27.6<L>                        10.2<L>   16.41<H> >-----------< 255    ( 11-25 @ 16:45 )             32.4<L>                        11.3<L>   10.34 >-----------< 286    ( 11-25 @ 07:58 )             35.4                  PE:  General: NAD  Heart: extremities well-perfused  Lungs: breathing normally  Abdomen: Soft, appropriately tender, incision c/d/i, fundus firm  Extremities: No erythema, no pitting edema  Gyn: lochia wnl    
POST OP DAY  1  s/p   SECTION    SUBJECTIVE:  I'm a little itchy    PAIN SCALE SCORE: [x] Refer to charted pain scores    THERAPY:  [x  ] Spinal morphine   [  ] Epidural morphine   [  ] IV PCA Hydromorphone 1 mg/ml    OBJECTIVE:  Comfortable Appearing    SEDATION SCORE:	  [ x ] Alert	    [  ] Drowsy        [  ] Arousable	[  ] Asleep	[  ] Unresponsive    Side Effects:	  [  ] None	     [  ] Nausea        [ x ] Pruritus        [  ] Weakness   [  ] Numbness        ASSESSMENT/ PLAN   [   ] Discontinue         [  ] Continue    [ x ] Change to prn Analgesics as per primary service.    DOCUMENTATION & VERIFICATION OF CURRENT MEDS [ x ] Done    COMMENTS: No Headache.  Mild Pruritis
Chief Complaint: Gestational DM     History: Pt seen at bedside. Pt tolerating oral diet. Pt denies nausea and vomiting/any signs of hypoglycemia. Pt reports an adequate appetite    MEDICATIONS  (STANDING):  acetaminophen     Tablet .. 975 milliGRAM(s) Oral <User Schedule>  dextrose 5%. 1000 milliLiter(s) (50 mL/Hr) IV Continuous <Continuous>  dextrose 5%. 1000 milliLiter(s) (100 mL/Hr) IV Continuous <Continuous>  dextrose 50% Injectable 12.5 Gram(s) IV Push once  dextrose 50% Injectable 25 Gram(s) IV Push once  dextrose 50% Injectable 25 Gram(s) IV Push once  diphtheria/tetanus/pertussis (acellular) Vaccine (Adacel) 0.5 milliLiter(s) IntraMuscular once  glucagon  Injectable 1 milliGRAM(s) IntraMuscular once  heparin   Injectable 5000 Unit(s) SubCutaneous every 12 hours  ibuprofen  Tablet. 600 milliGRAM(s) Oral every 6 hours  insulin lispro (ADMELOG) corrective regimen sliding scale   SubCutaneous three times a day before meals  insulin lispro (ADMELOG) corrective regimen sliding scale   SubCutaneous at bedtime  lactated ringers. 1000 milliLiter(s) (75 mL/Hr) IV Continuous <Continuous>  lactated ringers. 1000 milliLiter(s) (125 mL/Hr) IV Continuous <Continuous>  metFORMIN 500 milliGRAM(s) Oral daily  oxytocin Infusion 333.333 milliUNIT(s)/Min (1000 mL/Hr) IV Continuous <Continuous>    MEDICATIONS  (PRN):  dextrose Oral Gel 15 Gram(s) Oral once PRN Blood Glucose LESS THAN 70 milliGRAM(s)/deciliter  diphenhydrAMINE 25 milliGRAM(s) Oral every 6 hours PRN Pruritus  lanolin Ointment 1 Application(s) Topical every 6 hours PRN Sore Nipples  magnesium hydroxide Suspension 30 milliLiter(s) Oral two times a day PRN Constipation  oxyCODONE    IR 5 milliGRAM(s) Oral every 3 hours PRN Moderate to Severe Pain (4-10)  oxyCODONE    IR 5 milliGRAM(s) Oral once PRN Moderate to Severe Pain (4-10)  simethicone 80 milliGRAM(s) Chew every 4 hours PRN Gas      Allergies: No Known Allergies    Intolerances: amoxicillin (Diarrhea)    Review of Systems:  Respiratory: No SOB, no cough  GI: No nausea, vomiting, abdominal pain  Endocrine: no polyuria, polydipsia      PHYSICAL EXAM:  VITALS: T(C): 37 (11-28-23 @ 13:34)  T(F): 98.6 (11-28-23 @ 13:34), Max: 98.6 (11-28-23 @ 13:34)  HR: 82 (11-28-23 @ 13:34) (82 - 90)  BP: 111/59 (11-28-23 @ 13:34) (100/54 - 123/65)  RR:  (16 - 17)  SpO2:  (100% - 100%)  Wt(kg): --  GENERAL: NAD, well-groomed, well-developed  RESPIRATORY: No labored breathing   GI: Soft, nontender, non distended  PSYCH: Alert and oriented x 3, normal affect, normal mood      CAPILLARY BLOOD GLUCOSE  POCT Blood Glucose.: 92 mg/dL (28 Nov 2023 13:20)  POCT Blood Glucose.: 200 mg/dL (27 Nov 2023 22:37)  POCT Blood Glucose.: 189 mg/dL (27 Nov 2023 22:35)  POCT Blood Glucose.: 128 mg/dL (27 Nov 2023 17:15)      11-27    136  |  102  |  18  ----------------------------<  103<H>  3.8   |  26  |  0.71    eGFR: 107    Ca    8.4      11-27    TPro  5.5<L>  /  Alb  2.7<L>  /  TBili  <0.2  /  DBili  x   /  AST  29  /  ALT  16  /  AlkPhos  68  11-27      Diet, Consistent Carbohydrate/No Snacks (11-25-23 @ 17:31) [Active]

## 2023-12-15 PROBLEM — E11.9 TYPE 2 DIABETES MELLITUS WITHOUT COMPLICATIONS: Chronic | Status: ACTIVE | Noted: 2023-11-25

## 2023-12-15 PROBLEM — D64.9 ANEMIA, UNSPECIFIED: Chronic | Status: ACTIVE | Noted: 2023-11-25

## 2023-12-15 PROBLEM — O34.219 MATERNAL CARE FOR UNSPECIFIED TYPE SCAR FROM PREVIOUS CESAREAN DELIVERY: Chronic | Status: ACTIVE | Noted: 2023-11-25

## 2023-12-15 PROBLEM — O09.529 SUPERVISION OF ELDERLY MULTIGRAVIDA, UNSPECIFIED TRIMESTER: Chronic | Status: ACTIVE | Noted: 2023-11-20

## 2024-01-31 ENCOUNTER — APPOINTMENT (OUTPATIENT)
Dept: DERMATOLOGY | Facility: CLINIC | Age: 44
End: 2024-01-31
Payer: COMMERCIAL

## 2024-01-31 DIAGNOSIS — L85.3 XEROSIS CUTIS: ICD-10-CM

## 2024-01-31 PROCEDURE — 99203 OFFICE O/P NEW LOW 30 MIN: CPT

## 2024-01-31 RX ORDER — MUPIROCIN 20 MG/G
2 OINTMENT TOPICAL
Qty: 1 | Refills: 2 | Status: ACTIVE | COMMUNITY
Start: 2024-01-31 | End: 1900-01-01

## 2024-02-04 NOTE — ASSESSMENT
[FreeTextEntry1] : 1. Ulcers (x2) on the left lower abdomen  - Unclear initial source of ulceration. May have been an initial infection which is no longer active. PG is a consideration though there are no features to support the diagnosis at this time  - Education,  anticipatory guidance provided. May take several weeks to resolve  - START mupirocin 2% ointment to affected area BID  - Discussed limited utility of tissue culture and biopsy today - deferred at this time  - Advised keeping covered with non-stick dressing. Wound care discussed  - May consider a biopsy in the future if there is worsening of the ulceration or lack of improvement with appropriate wound care  2. Xerosis  3. Pruritus, likely 2/2 xerosis  - Discussed nature and course of condition with patient. - Discussed importance of liberal moisturization multiple times per day with OTC emollient/cream (e.g. Cerave cream, plain Vaseline)  - Dry skin care reviewed: shower no more than once daily for 5-10 minutes with lukewarm water, gentle soaps, products free of fragrances - Start OTC Sarna anti-itch lotion BID. Can keep in fridge prior to use   RTC 3-4 weeks or prn

## 2024-02-04 NOTE — PHYSICAL EXAM
[FreeTextEntry3] : - 2.5 x 3.0 cm pink ulcer on the left lower abdomen with pink granulation tissue at base, no purulence  - 0.7 x 0.8 cm ulcer medial to the ulcer above with pink granulation tissue at base  - xerosis - few hyperpigmented patches on the back

## 2024-02-04 NOTE — HISTORY OF PRESENT ILLNESS
[FreeTextEntry1] : NPV: ulcers [de-identified] : Referred by: none  ARLENE VASQUEZ is a 43 year old female new patient with hx obesity and TIIDM who presents for evaluation of the followin. Ulcers on the left lower abdomen, first noticed around 2023. Pt does not appreciate much pain in the area, is s/p  in 2023 and has had reduced sensation in the affected area since.   Pt denies any other

## 2024-02-09 NOTE — OB RN DELIVERY SUMMARY - BABY A: ID BAND NUMBER, DELIVERY
All lab and radiographic images reviewed by Dr. Nunn. Patient is not a surgical candidate, recommend medical therapy specifically - maximizing  beta blockers- nitrates and RANEXA.  Plan of  care explained to patient who expresses  understanding.   Care as per primary team Will increase Lantus to 15 units at bed time.  Will increase Admelog 5 units before each meal in addition to Admelog correction scale coverage.  Will continue monitoring FS, log, and glucose trends, will Follow up.  Patient counseled for compliance with consistent low carb diet and exercise as tolerated outpatient. [id number]

## 2024-02-29 ENCOUNTER — APPOINTMENT (OUTPATIENT)
Dept: DERMATOLOGY | Facility: CLINIC | Age: 44
End: 2024-02-29
Payer: COMMERCIAL

## 2024-02-29 PROCEDURE — 99213 OFFICE O/P EST LOW 20 MIN: CPT

## 2024-02-29 RX ORDER — TRIAMCINOLONE ACETONIDE 1 MG/G
0.1 CREAM TOPICAL
Qty: 1 | Refills: 1 | Status: ACTIVE | COMMUNITY
Start: 2024-02-29 | End: 1900-01-01

## 2024-02-29 NOTE — ASSESSMENT
[FreeTextEntry1] : 1. Ulcers (x2) on the left lower abdomen. 1 has healed since. other ulcer significantly improved from last visit  - Unclear initial source of ulceration. May have been an initial infection which is no longer active. PG is a consideration though there are no features to support the diagnosis at this time  - Education,  anticipatory guidance provided. May take several weeks to resolve  - Cont mupirocin 2% ointment to affected area BID  - Discussed limited utility of tissue culture and biopsy today - deferred at this time  - Advised keeping covered with non-stick dressing. Wound care discussed  - May consider a biopsy in the future if there is worsening of the ulceration or lack of improvement with appropriate wound care  2) Generalized pruritus - Idiopathic  - Cont liberal moisturizer and sarna lotion - Will check CBC and CMP - Add triamcinolone cream prn flares  - May consider phototherapy and/or antihistamines/gabapentin at next visit   RTC 6-8 weeks or sooner prn

## 2024-02-29 NOTE — HISTORY OF PRESENT ILLNESS
[FreeTextEntry1] : NPV: ulcers [de-identified] : Referred by: none  ARLENE VASQUEZ is a 43 year old female new patient with hx obesity and TIIDM who presents for evaluation of the followin. Ulcers on the left lower abdomen, first noticed around 2023. Pt does not appreciate much pain in the area, is s/p  in 2023 and has had reduced sensation in the affected area since. Notes they are significantly improved from the last visit.  Also has generalized itch not improving with Sarna lotion.

## 2024-04-11 ENCOUNTER — APPOINTMENT (OUTPATIENT)
Dept: DERMATOLOGY | Facility: CLINIC | Age: 44
End: 2024-04-11
Payer: COMMERCIAL

## 2024-04-11 DIAGNOSIS — L29.9 PRURITUS, UNSPECIFIED: ICD-10-CM

## 2024-04-11 DIAGNOSIS — L98.499 NON-PRESSURE CHRONIC ULCER OF SKIN OF OTHER SITES WITH UNSPECIFIED SEVERITY: ICD-10-CM

## 2024-04-11 PROCEDURE — 99213 OFFICE O/P EST LOW 20 MIN: CPT

## 2024-05-01 ENCOUNTER — APPOINTMENT (OUTPATIENT)
Dept: VASCULAR SURGERY | Facility: CLINIC | Age: 44
End: 2024-05-01
Payer: COMMERCIAL

## 2024-05-01 VITALS
HEIGHT: 60 IN | BODY MASS INDEX: 33.96 KG/M2 | HEART RATE: 83 BPM | SYSTOLIC BLOOD PRESSURE: 105 MMHG | TEMPERATURE: 98.1 F | DIASTOLIC BLOOD PRESSURE: 74 MMHG | WEIGHT: 173 LBS

## 2024-05-01 PROCEDURE — 99203 OFFICE O/P NEW LOW 30 MIN: CPT

## 2024-05-01 PROCEDURE — 93970 EXTREMITY STUDY: CPT

## 2024-05-06 NOTE — CONSULT LETTER
[Dear  ___] : Dear  [unfilled], [Consult Letter:] : I had the pleasure of evaluating your patient, [unfilled]. [Please see my note below.] : Please see my note below. [Consult Closing:] : Thank you very much for allowing me to participate in the care of this patient.  If you have any questions, please do not hesitate to contact me. [Sincerely,] : Sincerely, [FreeTextEntry3] : Stormy Hall MD, FSVS, FACS Associate Chief, Vascular & Endovascular Surgery , Vascular Surgery Fellowship & Residency  Associate Professor of Surgery, [employer] at [facility]  Division of Vascular & Endovascular Surgery [facility] [address], [address] [place] [id number] Tel: [phone]

## 2024-05-06 NOTE — ASSESSMENT
[FreeTextEntry1] : 43F w/ B/L leg swelling and crampy pain post partum, also c/o shooting pain from R hip down especially at night  VDUS - no DVT/SVT/or evidence of VI  Recommend compression garments Recommend continued exrcise and mobility no arterial or venous insufficiency found to explain pt's symptoms shooting pain on the R c/w sciatica, consider neruo eval and PT

## 2024-05-06 NOTE — PHYSICAL EXAM
[Respiratory Effort] : normal respiratory effort [Normal Rate and Rhythm] : normal rate and rhythm [2+] : left 2+ [Ankle Swelling (On Exam)] : present [Ankle Swelling On The Right] : mild [Varicose Veins Of Lower Extremities] : not present [] : not present [de-identified] : nad

## 2024-05-06 NOTE — HISTORY OF PRESENT ILLNESS
[FreeTextEntry1] : 43F advanced maternal age post-partum w/ development of B/L LE swelling and crampy pain since pregnancy. Has not resolved post partum. No claudication or arterial insufficiency. No wounds noted. No rest pain. Pt has not tried compression. Worse with standing all day/improved with elevation. No skin changes or ulcers. Otherwise no other symptoms. No hx of VI or vein procedures.

## 2024-05-20 ENCOUNTER — APPOINTMENT (OUTPATIENT)
Dept: ENDOCRINOLOGY | Facility: CLINIC | Age: 44
End: 2024-05-20

## 2024-08-15 ENCOUNTER — NON-APPOINTMENT (OUTPATIENT)
Age: 44
End: 2024-08-15

## 2024-08-15 ENCOUNTER — APPOINTMENT (OUTPATIENT)
Dept: ALLERGY | Facility: CLINIC | Age: 44
End: 2024-08-15
Payer: COMMERCIAL

## 2024-08-15 VITALS
TEMPERATURE: 97.4 F | OXYGEN SATURATION: 97 % | BODY MASS INDEX: 33.96 KG/M2 | HEART RATE: 94 BPM | SYSTOLIC BLOOD PRESSURE: 108 MMHG | HEIGHT: 60 IN | DIASTOLIC BLOOD PRESSURE: 64 MMHG | WEIGHT: 173 LBS

## 2024-08-15 PROCEDURE — 99204 OFFICE O/P NEW MOD 45 MIN: CPT

## 2024-08-15 RX ORDER — METFORMIN ER 500 MG 500 MG/1
500 TABLET ORAL
Refills: 0 | Status: ACTIVE | COMMUNITY

## 2024-08-15 NOTE — SOCIAL HISTORY
[Apartment] : [unfilled] lives in an apartment  [None] : none [] :  [FreeTextEntry1] :  leave - [place]  Lives with spouse - 3 children  [Smokers in Household] : there are no smokers in the home

## 2024-08-15 NOTE — PHYSICAL EXAM
[Alert] : alert [Well Nourished] : well nourished [No Acute Distress] : no acute distress [Well Developed] : well developed [No Neck Mass] : no neck mass was observed [No LAD] : no lymphadenopathy [Normal Rate and Effort] : normal respiratory rhythm and effort [No Crackles] : no crackles [No Retractions] : no retractions [Wheezing] : no wheezing was heard [Normal Rate] : heart rate was normal  [Normal S1, S2] : normal S1 and S2 [Regular Rhythm] : with a regular rhythm [Normal Cervical Lymph Nodes] : cervical [No Rash] : no rash [No clubbing] : no clubbing [No Cyanosis] : no cyanosis [Normal Mood] : mood was normal [Judgment and Insight Age Appropriate] : judgement and insight is age appropriate

## 2024-08-15 NOTE — HISTORY OF PRESENT ILLNESS
[de-identified] : Patient born in [place] - developed rash x 6 days - she has seen PCP - injection given and Medrol pack - elevated blood sugar level - discontinued the oral steroids - advised to take Benadryl but she did not have any - some rash has continued since stopping the steroids yesterday.   Patient had mild cough and respiratory symptoms - daughter had URI prior to patient getting sick.  No COVID testing done on either.    Positive blood testing to shellfish - but eats with no reactions.

## 2024-08-15 NOTE — ASSESSMENT
[FreeTextEntry1] : Acute urticaria - post viral  Patient presently nursing her child   Claritin 10 mg BID - she can increase to 20 mg BID if symptoms not controlled  Avoid oral steroids secondary to blood sugar

## 2024-08-23 ENCOUNTER — APPOINTMENT (OUTPATIENT)
Dept: ALLERGY | Facility: CLINIC | Age: 44
End: 2024-08-23

## 2025-06-16 ENCOUNTER — APPOINTMENT (OUTPATIENT)
Dept: INTERNAL MEDICINE | Facility: CLINIC | Age: 45
End: 2025-06-16

## (undated) DEVICE — DRSG DERMABOND PRINEO 22CM